# Patient Record
Sex: FEMALE | Race: OTHER | Employment: FULL TIME | URBAN - METROPOLITAN AREA
[De-identification: names, ages, dates, MRNs, and addresses within clinical notes are randomized per-mention and may not be internally consistent; named-entity substitution may affect disease eponyms.]

---

## 2021-01-03 ENCOUNTER — HOSPITAL ENCOUNTER (EMERGENCY)
Age: 61
Discharge: ANOTHER HOSPITAL | End: 2021-01-04
Attending: EMERGENCY MEDICINE
Payer: COMMERCIAL

## 2021-01-03 ENCOUNTER — APPOINTMENT (OUTPATIENT)
Dept: CT IMAGING | Age: 61
End: 2021-01-03
Payer: COMMERCIAL

## 2021-01-03 DIAGNOSIS — D61.818 PANCYTOPENIA (HCC): ICD-10-CM

## 2021-01-03 DIAGNOSIS — H40.052 INCREASED INTRAOCULAR PRESSURE, LEFT: Primary | ICD-10-CM

## 2021-01-03 DIAGNOSIS — R91.8 LUNG NODULES: ICD-10-CM

## 2021-01-03 DIAGNOSIS — R51.9 LEFT TEMPORAL HEADACHE: ICD-10-CM

## 2021-01-03 DIAGNOSIS — H53.9 VISUAL DISTURBANCE: ICD-10-CM

## 2021-01-03 LAB
A/G RATIO: 1.6 (ref 1.1–2.2)
ALBUMIN SERPL-MCNC: 4 G/DL (ref 3.4–5)
ALP BLD-CCNC: 94 U/L (ref 40–129)
ALT SERPL-CCNC: 11 U/L (ref 10–40)
ANION GAP SERPL CALCULATED.3IONS-SCNC: 8 MMOL/L (ref 3–16)
AST SERPL-CCNC: 16 U/L (ref 15–37)
BASOPHILS ABSOLUTE: 0.1 K/UL (ref 0–0.2)
BASOPHILS RELATIVE PERCENT: 0.8 %
BILIRUB SERPL-MCNC: 0.3 MG/DL (ref 0–1)
BILIRUBIN URINE: NEGATIVE
BLOOD, URINE: NEGATIVE
BUN BLDV-MCNC: 14 MG/DL (ref 7–20)
CALCIUM SERPL-MCNC: 8.9 MG/DL (ref 8.3–10.6)
CHLORIDE BLD-SCNC: 106 MMOL/L (ref 99–110)
CLARITY: CLEAR
CO2: 25 MMOL/L (ref 21–32)
COLOR: YELLOW
CREAT SERPL-MCNC: 0.7 MG/DL (ref 0.6–1.2)
EOSINOPHILS ABSOLUTE: 0.2 K/UL (ref 0–0.6)
EOSINOPHILS RELATIVE PERCENT: 2.4 %
EPITHELIAL CELLS, UA: 1 /HPF (ref 0–5)
GFR AFRICAN AMERICAN: >60
GFR NON-AFRICAN AMERICAN: >60
GLOBULIN: 2.5 G/DL
GLUCOSE BLD-MCNC: 110 MG/DL (ref 70–99)
GLUCOSE URINE: NEGATIVE MG/DL
HCT VFR BLD CALC: 24.2 % (ref 36–48)
HEMOGLOBIN: 7.4 G/DL (ref 12–16)
HYALINE CASTS: 1 /LPF (ref 0–8)
KETONES, URINE: NEGATIVE MG/DL
LEUKOCYTE ESTERASE, URINE: ABNORMAL
LYMPHOCYTES ABSOLUTE: 2.1 K/UL (ref 1–5.1)
LYMPHOCYTES RELATIVE PERCENT: 24.2 %
MCH RBC QN AUTO: 22.3 PG (ref 26–34)
MCHC RBC AUTO-ENTMCNC: 30.6 G/DL (ref 31–36)
MCV RBC AUTO: 72.7 FL (ref 80–100)
MICROSCOPIC EXAMINATION: YES
MONOCYTES ABSOLUTE: 0.7 K/UL (ref 0–1.3)
MONOCYTES RELATIVE PERCENT: 8.3 %
NEUTROPHILS ABSOLUTE: 5.5 K/UL (ref 1.7–7.7)
NEUTROPHILS RELATIVE PERCENT: 64.3 %
NITRITE, URINE: NEGATIVE
PDW BLD-RTO: 18.4 % (ref 12.4–15.4)
PH UA: 5.5 (ref 5–8)
PLATELET # BLD: 159 K/UL (ref 135–450)
PMV BLD AUTO: 10.7 FL (ref 5–10.5)
POTASSIUM REFLEX MAGNESIUM: 3.8 MMOL/L (ref 3.5–5.1)
PROTEIN UA: NEGATIVE MG/DL
RBC # BLD: 3.33 M/UL (ref 4–5.2)
RBC UA: 0 /HPF (ref 0–4)
SEDIMENTATION RATE, ERYTHROCYTE: 35 MM/HR (ref 0–30)
SODIUM BLD-SCNC: 139 MMOL/L (ref 136–145)
SPECIFIC GRAVITY UA: 1.01 (ref 1–1.03)
TOTAL PROTEIN: 6.5 G/DL (ref 6.4–8.2)
URINE REFLEX TO CULTURE: ABNORMAL
URINE TYPE: ABNORMAL
UROBILINOGEN, URINE: 0.2 E.U./DL
WBC # BLD: 8.5 K/UL (ref 4–11)
WBC UA: 4 /HPF (ref 0–5)

## 2021-01-03 PROCEDURE — 70498 CT ANGIOGRAPHY NECK: CPT

## 2021-01-03 PROCEDURE — 6360000004 HC RX CONTRAST MEDICATION: Performed by: EMERGENCY MEDICINE

## 2021-01-03 PROCEDURE — 70450 CT HEAD/BRAIN W/O DYE: CPT

## 2021-01-03 PROCEDURE — 2580000003 HC RX 258: Performed by: NURSE PRACTITIONER

## 2021-01-03 PROCEDURE — 99284 EMERGENCY DEPT VISIT MOD MDM: CPT

## 2021-01-03 PROCEDURE — 86140 C-REACTIVE PROTEIN: CPT

## 2021-01-03 PROCEDURE — 85025 COMPLETE CBC W/AUTO DIFF WBC: CPT

## 2021-01-03 PROCEDURE — 36415 COLL VENOUS BLD VENIPUNCTURE: CPT

## 2021-01-03 PROCEDURE — 80053 COMPREHEN METABOLIC PANEL: CPT

## 2021-01-03 PROCEDURE — 85652 RBC SED RATE AUTOMATED: CPT

## 2021-01-03 PROCEDURE — 81001 URINALYSIS AUTO W/SCOPE: CPT

## 2021-01-03 PROCEDURE — 85610 PROTHROMBIN TIME: CPT

## 2021-01-03 RX ORDER — PROPARACAINE HYDROCHLORIDE 5 MG/ML
1 SOLUTION/ DROPS OPHTHALMIC ONCE
Status: DISCONTINUED | OUTPATIENT
Start: 2021-01-03 | End: 2021-01-04 | Stop reason: HOSPADM

## 2021-01-03 RX ORDER — 0.9 % SODIUM CHLORIDE 0.9 %
1000 INTRAVENOUS SOLUTION INTRAVENOUS ONCE
Status: COMPLETED | OUTPATIENT
Start: 2021-01-03 | End: 2021-01-03

## 2021-01-03 RX ORDER — METHYLPREDNISOLONE SODIUM SUCCINATE 125 MG/2ML
125 INJECTION, POWDER, LYOPHILIZED, FOR SOLUTION INTRAMUSCULAR; INTRAVENOUS ONCE
Status: DISCONTINUED | OUTPATIENT
Start: 2021-01-03 | End: 2021-01-03

## 2021-01-03 RX ORDER — KETOROLAC TROMETHAMINE 30 MG/ML
15 INJECTION, SOLUTION INTRAMUSCULAR; INTRAVENOUS ONCE
Status: DISCONTINUED | OUTPATIENT
Start: 2021-01-03 | End: 2021-01-03

## 2021-01-03 RX ORDER — PROCHLORPERAZINE EDISYLATE 5 MG/ML
10 INJECTION INTRAMUSCULAR; INTRAVENOUS ONCE
Status: DISCONTINUED | OUTPATIENT
Start: 2021-01-03 | End: 2021-01-03

## 2021-01-03 RX ORDER — ACETAMINOPHEN 500 MG
1000 TABLET ORAL EVERY 6 HOURS PRN
COMMUNITY

## 2021-01-03 RX ORDER — LOSARTAN POTASSIUM 25 MG/1
25 TABLET ORAL DAILY
COMMUNITY
Start: 2020-03-02

## 2021-01-03 RX ADMIN — SODIUM CHLORIDE 1000 ML: 9 INJECTION, SOLUTION INTRAVENOUS at 21:51

## 2021-01-03 RX ADMIN — IOPAMIDOL 75 ML: 755 INJECTION, SOLUTION INTRAVENOUS at 23:18

## 2021-01-03 ASSESSMENT — PAIN DESCRIPTION - LOCATION
LOCATION: EYE
LOCATION: HEAD
LOCATION: EYE

## 2021-01-03 ASSESSMENT — VISUAL ACUITY
OD: 20/20
OU: 20/20

## 2021-01-03 ASSESSMENT — PAIN DESCRIPTION - PAIN TYPE
TYPE: ACUTE PAIN
TYPE: ACUTE PAIN

## 2021-01-03 ASSESSMENT — PAIN SCALES - GENERAL
PAINLEVEL_OUTOF10: 7
PAINLEVEL_OUTOF10: 7

## 2021-01-03 ASSESSMENT — PAIN DESCRIPTION - ORIENTATION: ORIENTATION: LEFT

## 2021-01-03 ASSESSMENT — PAIN DESCRIPTION - FREQUENCY
FREQUENCY: CONTINUOUS
FREQUENCY: CONTINUOUS

## 2021-01-03 ASSESSMENT — PAIN DESCRIPTION - PROGRESSION: CLINICAL_PROGRESSION: NOT CHANGED

## 2021-01-03 ASSESSMENT — PAIN - FUNCTIONAL ASSESSMENT: PAIN_FUNCTIONAL_ASSESSMENT: PREVENTS OR INTERFERES SOME ACTIVE ACTIVITIES AND ADLS

## 2021-01-04 VITALS
DIASTOLIC BLOOD PRESSURE: 48 MMHG | HEIGHT: 66 IN | WEIGHT: 165.57 LBS | RESPIRATION RATE: 17 BRPM | TEMPERATURE: 98.2 F | BODY MASS INDEX: 26.61 KG/M2 | SYSTOLIC BLOOD PRESSURE: 130 MMHG | OXYGEN SATURATION: 100 % | HEART RATE: 87 BPM

## 2021-01-04 LAB
C-REACTIVE PROTEIN: 1.6 MG/L (ref 0–5.1)
INR BLD: 1.03 (ref 0.86–1.14)
OCCULT BLOOD DIAGNOSTIC: NORMAL
PROTHROMBIN TIME: 11.9 SEC (ref 10–13.2)

## 2021-01-04 PROCEDURE — 96374 THER/PROPH/DIAG INJ IV PUSH: CPT

## 2021-01-04 PROCEDURE — G0328 FECAL BLOOD SCRN IMMUNOASSAY: HCPCS

## 2021-01-04 PROCEDURE — 6370000000 HC RX 637 (ALT 250 FOR IP): Performed by: NURSE PRACTITIONER

## 2021-01-04 PROCEDURE — 6360000002 HC RX W HCPCS: Performed by: NURSE PRACTITIONER

## 2021-01-04 RX ORDER — DORZOLAMIDE HCL 20 MG/ML
1 SOLUTION/ DROPS OPHTHALMIC ONCE
Status: COMPLETED | OUTPATIENT
Start: 2021-01-04 | End: 2021-01-04

## 2021-01-04 RX ORDER — BRIMONIDINE TARTRATE 2 MG/ML
1 SOLUTION/ DROPS OPHTHALMIC ONCE
Status: COMPLETED | OUTPATIENT
Start: 2021-01-04 | End: 2021-01-04

## 2021-01-04 RX ORDER — ACETAZOLAMIDE 500 MG/5ML
500 INJECTION, POWDER, LYOPHILIZED, FOR SOLUTION INTRAVENOUS ONCE
Status: COMPLETED | OUTPATIENT
Start: 2021-01-04 | End: 2021-01-04

## 2021-01-04 RX ORDER — TIMOLOL MALEATE 5 MG/ML
1 SOLUTION/ DROPS OPHTHALMIC ONCE
Status: COMPLETED | OUTPATIENT
Start: 2021-01-04 | End: 2021-01-04

## 2021-01-04 RX ADMIN — DORZOLAMIDE HYDROCHLORIDE 1 DROP: 20 SOLUTION/ DROPS OPHTHALMIC at 01:26

## 2021-01-04 RX ADMIN — BRIMONIDINE TARTRATE 1 DROP: 2 SOLUTION OPHTHALMIC at 01:26

## 2021-01-04 RX ADMIN — ACETAZOLAMIDE SODIUM 500 MG: 500 INJECTION, POWDER, LYOPHILIZED, FOR SOLUTION INTRAVENOUS at 01:39

## 2021-01-04 RX ADMIN — TIMOLOL MALEATE 1 DROP: 5 SOLUTION/ DROPS OPHTHALMIC at 01:26

## 2021-01-04 ASSESSMENT — ENCOUNTER SYMPTOMS
SHORTNESS OF BREATH: 0
COUGH: 0
CONSTIPATION: 0
COLOR CHANGE: 0
ABDOMINAL PAIN: 0
EYE PAIN: 1
VOMITING: 0

## 2021-01-04 NOTE — ED NOTES
Report given to Miroslava Xie, charge RN in the ER  Pain 7/10     Mima Mckeon RN  01/04/21 100 Gaurang Morel RN  01/04/21 [id number]

## 2021-01-04 NOTE — ED PROVIDER NOTES
[facility]        Pt Name: Tova Hutchins  MRN: [id number]  [place] 1960  Date of evaluation: 1/3/2021  Provider: GABRIEL Crowder CNP  PCP: [address] [place] [address]     I have seen and evaluated this patient with my supervising physician Wilfred Dow MD.    [facility]       Chief Complaint   Patient presents with    Migraine     c/o headache since 1pm while at work. OTC meds not effective. now left eye vision blurry. hx of migraines. HISTORY OF PRESENT ILLNESS   (Location, Timing/Onset, Context/Setting, Quality, Duration, Modifying Factors, Severity, Associated Signs and Symptoms)  Note limiting factors. Tova Hutchins is a 64 y.o. female with medical history of headache vertigo, cholecystectomy, colon polyps, and hypertension presents the ED with complaints of left-sided pain into her temporal region with blurry vision in her left eye that occurred approximately noon today. Patient had thought she was getting a migraine headache and was at work at a nursing home. She asked one of the staff there for Tylenol and took this at 1300 did not have relief of symptoms. She then later took aspirin and did not have relief of symptoms. He does note progressive blurry vision and is now only able to see the light and has a loss of distant vision. Patient does note a remote history of headaches and migraines, although says she has not had one this bad in approximately 2 years. When the pain started it was 9/10 and is currently at 7/10. She did notice worse when she was driving home and had difficulty seeing out of the left eye. She complains of pain behind the eye and no actual pain in the eye or when blinking. She denies seeing any flashers, floaters, or central vision loss. She does note wearing glasses, although believes the prescription is approximately 1years old.   Denies any family history of glaucoma she denies any recent trauma, accidents, head injuries, or falls. She denies any associated ataxia, dysphagia, lightheaded, dizzy, syncope, nausea, vomiting, diarrhea, chest pain, short of air, cough, wheezing, fever, rashes, neck pain, back pain, photophobia, eye discharge, periorbital edema. She denies any smoking, alcohol use, or street drugs. Nursing Notes were all reviewed and agreed with or any disagreements were addressed in the HPI. REVIEW OF SYSTEMS    (2-9 systems for level 4, 10 or more for level 5)     Review of Systems    Positives and Pertinent negatives as per HPI. Except as noted above in the ROS, all other systems were reviewed and negative. PAST MEDICAL HISTORY     Past Medical History:   Diagnosis Date    Headache(784.0)     Hypertension          SURGICAL HISTORY     Past Surgical History:   Procedure Laterality Date    CHOLECYSTECTOMY      SMALL INTESTINE SURGERY           CURRENTMEDICATIONS       Discharge Medication List as of 1/4/2021  2:17 AM      CONTINUE these medications which have NOT CHANGED    Details   vitamin D (CHOLECALCIFEROL) 25 MCG (1000 UT) TABS tablet Take 1,000 Units by mouth dailyHistorical Med      losartan (COZAAR) 25 MG tablet Take 25 mg by mouth dailyHistorical Med      acetaminophen (TYLENOL) 500 MG tablet Take 1,000 mg by mouth every 6 hours as needed for PainHistorical Med      polyethylene glycol (MIRALAX) powder Take 17 g by mouth daily as needed (constipation), Disp-510 g, R-0               ALLERGIES     Tetanus toxoids    FAMILYHISTORY     History reviewed. No pertinent family history.        SOCIAL HISTORY       Social History     Tobacco Use    Smoking status: Never Smoker    Smokeless tobacco: Never Used   Substance Use Topics    Alcohol use: No    Drug use: No       SCREENINGS             PHYSICAL EXAM    (up to 7 for level 4, 8 or more for level 5)     ED Triage Vitals [01/03/21 2005]   BP Temp Temp Source Pulse Resp SpO2 Height Weight   (!) 150/78 97.3 °F (36.3 °C) Temporal 84 16 100 % 5' 6\" (1.676 m) 165 lb 9.1 oz (75.1 kg)       Physical Exam  Vitals signs and nursing note reviewed. Constitutional:       General: She is awake. Appearance: Normal appearance. She is well-developed and overweight. HENT:      Head: Normocephalic and atraumatic. Nose: Nose normal.   Eyes:      General: Lids are normal.         Right eye: No discharge. Left eye: No discharge. Extraocular Movements: Extraocular movements intact. Conjunctiva/sclera: Conjunctivae normal.      Visual Fields: Right eye visual fields normal.      Left eye: DL in the upper nasal quadrant. DL in the upper temporal quadrant. DL in the lower nasal quadrant. DL in the lower temporal quadrant. Comments: Left cornea appears cloudy. Central vision in left eye detects movements when approximately 6 inches from face. Anything further patient does not see movements and only sees light. IOP OS 45, 48, 49  OD 16, 17, 19    Visual acuity could not be obtained from OS   Neck:      Musculoskeletal: Full passive range of motion without pain and normal range of motion. Cardiovascular:      Rate and Rhythm: Normal rate and regular rhythm. Heart sounds: Normal heart sounds. Pulmonary:      Effort: Pulmonary effort is normal. No respiratory distress. Breath sounds: Normal breath sounds. Abdominal:      General: Bowel sounds are normal.      Palpations: Abdomen is soft. Tenderness: There is no abdominal tenderness. Genitourinary:     Rectum: Guaiac result negative. No tenderness. Comments: Coleen Santana RN chaperoned rectal exam  Musculoskeletal: Normal range of motion. Right lower leg: No edema. Left lower leg: No edema. Skin:     General: Skin is warm and dry. Coloration: Skin is not pale. Neurological:      General: No focal deficit present.       Mental Status: She is alert and oriented to person, place, and noted below, none     Procedures    CRITICAL CARE TIME   N/A    CONSULTS:  IP CONSULT TO OPHTHALMOLOGY      EMERGENCY DEPARTMENT COURSE and DIFFERENTIAL DIAGNOSIS/MDM:   Vitals:    Vitals:    01/03/21 2300 01/04/21 0000 01/04/21 0100 01/04/21 0200   BP: 135/69 136/61 130/62 (!) 130/48   Pulse:   86 87   Resp:   17 17   Temp:    98.2 °F (36.8 °C)   TempSrc:    Oral   SpO2:   100% 100%   Weight:       Height:           Patient was given the following medications:  Medications   proparacaine (ALCAINE) 0.5 % ophthalmic solution 1 drop (has no administration in time range)   0.9 % sodium chloride bolus (0 mLs Intravenous Stopped 1/3/21 2258)   iopamidol (ISOVUE-370) 76 % injection 75 mL (75 mLs Intravenous Given 1/3/21 2318)   brimonidine (ALPHAGAN) 0.2 % ophthalmic solution 1 drop (1 drop Left Eye Given 1/4/21 0126)   timolol (TIMOPTIC) 0.5 % ophthalmic solution 1 drop (1 drop Left Eye Given 1/4/21 0126)   dorzolamide (TRUSOPT) 2 % ophthalmic solution 1 drop (1 drop Left Eye Given 1/4/21 0126)   acetaZOLAMIDE (DIAMOX) injection 500 mg (500 mg Intravenous Given 1/4/21 0139)           Pertinent Labs & Imaging studies reviewed. (See chart for details)   -  Patient seen and evaluated in the emergency department. -  Triage and nursing notes reviewed and incorporated. -  Old chart records reviewed and incorporated. -  Patient case discussed with attending physician,  Dr. Stevie Denson. They saw and examined patient.   -  Differential diagnosis includes:  Temporal arteritis, glaucoma, retinal artery occlusion, CVA, ICH, SAH, migraine, river blindness, giant cell arteritis, keratitis, uveitis, retinal detachment, vs COVID-19.  -  Work-up included:  See above ESR, CRP, CBC, CMP, UA, INR, CT head without, CTA head and neck, visual acuity  -  ED treatment included:  Alcaine, normal saline, brimonidine, timolol, dorzolamide, Diamox  - Consults: Ophthalmology, Dr. Ibeth Patterson, she request the patient to be started on ophthalmic drops of brimonidine, timolol, and dorzolamide 1 drop in the left eye now and to receive an IV infusion of Diamox 500 mg. If EMS has not arrived within 30 minutes of her administration to repeat the dosing in 30 minutes. She request the patient to be transferred to [facility] ophthalmology as she is also on-call at [facility] and patient will need further evaluation for a fall, work-up. -  Results discussed with patient. Labs show  Hemoccult negative. UA shows small leukocytes. CBC with RBC 3.33, hemoglobin 7.4, hematocrit 24.2, MCV 72.7, MCH 22.3, MCHC 30.6, RDW 18.4, MPV 10.7. These results were discussed with patient and she was informed previously that she had a form of iron deficiency anemia, although she was not instructed to take any iron supplements. She was told not to increase dietary intake of red meats due to the gout. CMP with glucose 110. ESR 35. Pro time 11.9, INR 1.03.  CT head neck with contrast shows no acute known arterial abnormality or hemodynamically significant arterial stenosis in the head or neck. Multiple sub-4 mm noncalcified nodules in the upper lungs are likely benign per guidelines below. No further follow-up is warranted. CT head without shows no acute intercranial abnormality. Results were discussed with the patient and need to be transferred to [facility] ED for further evaluation by ophthalmology. The patient is agreeable with plan of care and disposition.  -  Disposition:    Transfer to  accepting, Dr. Jayy Navarrete time was 45 minutes, excluding separately reportable procedures. There was a high probability of clinically significant/life threatening deterioration in the patient's condition which required my urgent intervention. FINAL IMPRESSION      1. Increased intraocular pressure, left    2. Lung nodules    3. Left temporal headache    4. Pancytopenia ([address]    5.  Visual disturbance          DISPOSITION/PLAN   DISPOSITION    Transfer to  ED         (Please note that portions of this note were completed with a voice recognition program.  Efforts were made to edit the dictations but occasionally words are mis-transcribed.)    GABRIEL Glass CNP (electronically signed)            GABRIEL Glass CNP  01/04/21 7566

## 2021-01-04 NOTE — ED PROVIDER NOTES
[facility]      Pt Name: Shahid Luna  MRN: [id number]  [place] 1960  Date of evaluation: 1/3/2021  Provider: Margot Gillespie MD    CHIEF COMPLAINT       Chief Complaint   Patient presents with    Migraine     c/o headache since 1pm while at work. OTC meds not effective. now left eye vision blurry. hx of migraines. HISTORY OF PRESENT ILLNESS    Shahid Luna is a 64 y.o. female who presents to the emergency department with headache since 1 PM.  Endorses pain behind left eye with blurry vision. Over-the-counter medications not effective. Pain is 8 out of 10 dull in nature. Never happened before. No other associated symptoms. Nursing Notes were reviewed. Including nursing noted for FM, Surgical History, Past Medical History, Social History, vitals, and allergies; agree with all. REVIEW OF SYSTEMS       Review of Systems   Constitutional: Negative for diaphoresis and unexpected weight change. HENT: Negative for congestion and dental problem. Eyes: Positive for pain and visual disturbance. Respiratory: Negative for cough and shortness of breath. Cardiovascular: Negative for chest pain and leg swelling. Gastrointestinal: Negative for abdominal pain, constipation and vomiting. Endocrine: Negative for cold intolerance and heat intolerance. Genitourinary: Negative for vaginal bleeding, vaginal discharge and vaginal pain. Musculoskeletal: Negative for neck pain and neck stiffness. Skin: Negative for color change and pallor. Neurological: Negative for tremors and weakness. Psychiatric/Behavioral: Negative for agitation and behavioral problems. Except as noted above the remainder of the review of systems was reviewed and negative.      PAST MEDICAL HISTORY     Past Medical History:   Diagnosis Date    Headache(784.0)     Hypertension        SURGICAL HISTORY       Past Surgical History: Procedure Laterality Date    CHOLECYSTECTOMY      SMALL INTESTINE SURGERY         CURRENT MEDICATIONS       Discharge Medication List as of 1/4/2021  2:17 AM      CONTINUE these medications which have NOT CHANGED    Details   vitamin D (CHOLECALCIFEROL) 25 MCG (1000 UT) TABS tablet Take 1,000 Units by mouth dailyHistorical Med      losartan (COZAAR) 25 MG tablet Take 25 mg by mouth dailyHistorical Med      acetaminophen (TYLENOL) 500 MG tablet Take 1,000 mg by mouth every 6 hours as needed for PainHistorical Med      polyethylene glycol (MIRALAX) powder Take 17 g by mouth daily as needed (constipation), Disp-510 g, R-0             ALLERGIES     Tetanus toxoids    FAMILY HISTORY      History reviewed. No pertinent family history.     SOCIAL HISTORY       Social History     Socioeconomic History    Marital status:      Spouse name: None    Number of children: None    Years of education: None    Highest education level: None   Occupational History    None   Social Needs    Financial resource strain: None    Food insecurity     Worry: None     Inability: None    Transportation needs     Medical: None     Non-medical: None   Tobacco Use    Smoking status: Never Smoker    Smokeless tobacco: Never Used   Substance and Sexual Activity    Alcohol use: No    Drug use: No    Sexual activity: None   Lifestyle    Physical activity     Days per week: None     Minutes per session: None    Stress: None   Relationships    Social connections     Talks on phone: None     Gets together: None     Attends Yazdanism service: None     Active member of club or organization: None     Attends meetings of clubs or organizations: None     Relationship status: None    Intimate partner violence     Fear of current or ex partner: None     Emotionally abused: None     Physically abused: None     Forced sexual activity: None   Other Topics Concern    None   Social History Narrative    None       PHYSICAL EXAM       ED Triage Vitals [01/03/21 2005]   BP Temp Temp Source Pulse Resp SpO2 Height Weight   (!) 150/78 97.3 °F (36.3 °C) Temporal 84 16 100 % 5' 6\" (1.676 m) 165 lb 9.1 oz (75.1 kg)       Physical Exam  Vitals signs and nursing note reviewed. Constitutional:       General: She is not in acute distress. Appearance: She is well-developed. She is not ill-appearing, toxic-appearing or diaphoretic. HENT:      Head: Normocephalic and atraumatic. Right Ear: External ear normal.      Left Ear: External ear normal.   Eyes:      General:         Right eye: No discharge. Left eye: No discharge. Conjunctiva/sclera: Conjunctivae normal.      Pupils: Pupils are equal, round, and reactive to light. Neck:      Musculoskeletal: Normal range of motion and neck supple. Cardiovascular:      Rate and Rhythm: Normal rate and regular rhythm. Heart sounds: No murmur. Pulmonary:      Effort: Pulmonary effort is normal. No respiratory distress. Breath sounds: Normal breath sounds. No wheezing or rales. Abdominal:      General: Bowel sounds are normal. There is no distension. Palpations: Abdomen is soft. There is no mass. Tenderness: There is no abdominal tenderness. There is no guarding or rebound. Genitourinary:     Comments: Deferred  Musculoskeletal: Normal range of motion. General: No deformity. Skin:     General: Skin is warm. Findings: No erythema or rash. Neurological:      Mental Status: She is alert and oriented to person, place, and time. She is not disoriented. Cranial Nerves: No cranial nerve deficit. Motor: No atrophy or abnormal muscle tone. Coordination: Coordination normal.   Psychiatric:         Behavior: Behavior normal.         Thought Content:  Thought content normal.         DIAGNOSTIC RESULTS     EKG: All EKG's are interpreted by the Emergency Department Physician who either signs or Co-signs this chart in the absence of acardiologist.    None    RADIOLOGY:   Non-plain film images such as CT, Ultrasoundand MRI are read by the radiologist. Plain radiographic images are visualized and preliminarily interpreted by the emergency physician with the below findings:    Impression   1. No acute arterial abnormality or hemodynamically significant arterial   stenosis in the head or neck. 2. Multiple sub 4 mm noncalcified nodules in the upper lungs are likely   benign per guidelines below.  No further follow-up is warranted.       RECOMMENDATIONS:   Multiple pulmonary nodules. Most severe: 4.0 mm solid pulmonary nodule within   the upper lobe detected on incomplete chest CT. No routine follow-up imaging   is recommended.       These guidelines do not apply to immunocompromised patients and patients with   cancer. Follow up in patients with significant comorbidities as clinically   warranted. For lung cancer screening, adhere to Lung-RADS guidelines. Reference: Radiology. 2017; 284(1):228-43. Impression   No acute intracranial abnormality.      ED BEDSIDE ULTRASOUND:   Performed by ED Physician - none    LABS:  Labs Reviewed   CBC WITH AUTO DIFFERENTIAL - Abnormal; Notable for the following components:       Result Value    RBC 3.33 (*)     Hemoglobin 7.4 (*)     Hematocrit 24.2 (*)     MCV 72.7 (*)     MCH 22.3 (*)     MCHC 30.6 (*)     RDW 18.4 (*)     MPV 10.7 (*)     All other components within normal limits    Narrative:     Performed at:  [address] [address]   Phone [phone]   COMPREHENSIVE METABOLIC PANEL W/ REFLEX TO MG FOR LOW K - Abnormal; Notable for the following components:    Glucose 110 (*)     All other components within normal limits    Narrative:     Performed at:  [address] [address]   Phone [phone]   URINE RT REFLEX TO CULTURE - Abnormal; Notable for the following excluding separately reportable procedures. There was a high probability of clinically significant/life threatening deterioration in the patient's condition which required my urgent intervention. PROCEDURES:  Unlessotherwise noted below, none    FINAL IMPRESSION      1. Increased intraocular pressure, left    2. Lung nodules    3. Left temporal headache    4. Pancytopenia ([address]    5.  Visual disturbance          DISPOSITION/PLAN   DISPOSITION Decision To Transfer 01/04/2021 01:06:15 AM   (Please note that portions ofthis note were completed with a voice recognition program.  Efforts were made to edit the dictations but occasionally words are mis-transcribed.)    Merary Rey MD(electronically signed)  Attending Emergency Physician            Merary Rey MD  01/04/21 [id number]

## 2022-01-05 ENCOUNTER — APPOINTMENT (OUTPATIENT)
Dept: GENERAL RADIOLOGY | Age: 62
End: 2022-01-05
Payer: COMMERCIAL

## 2022-01-05 ENCOUNTER — HOSPITAL ENCOUNTER (EMERGENCY)
Age: 62
Discharge: HOME | End: 2022-01-05
Payer: COMMERCIAL

## 2022-01-05 VITALS
BODY MASS INDEX: 25.93 KG/M2 | HEART RATE: 83 BPM | HEIGHT: 67 IN | RESPIRATION RATE: 16 BRPM | OXYGEN SATURATION: 97 % | DIASTOLIC BLOOD PRESSURE: 71 MMHG | TEMPERATURE: 98.7 F | SYSTOLIC BLOOD PRESSURE: 143 MMHG

## 2022-01-05 DIAGNOSIS — R07.81 RIB PAIN ON LEFT SIDE: Primary | ICD-10-CM

## 2022-01-05 PROCEDURE — 71101 X-RAY EXAM UNILAT RIBS/CHEST: CPT

## 2022-01-05 PROCEDURE — 99283 EMERGENCY DEPT VISIT LOW MDM: CPT

## 2022-01-05 RX ORDER — NAPROXEN 375 MG/1
375 TABLET ORAL 2 TIMES DAILY WITH MEALS
Qty: 20 TABLET | Refills: 0 | Status: SHIPPED | OUTPATIENT
Start: 2022-01-05 | End: 2022-04-22

## 2022-01-05 ASSESSMENT — PAIN DESCRIPTION - PAIN TYPE: TYPE: ACUTE PAIN

## 2022-01-05 ASSESSMENT — PAIN SCALES - GENERAL: PAINLEVEL_OUTOF10: 5

## 2022-01-05 NOTE — ED PROVIDER NOTES
[facility]        Pt Name: Tameka Panchal  MRN: [id number]  [place] 1960  Date of evaluation: 1/5/2022  Provider: Jarett Osman PA-C  PCP: Boyd MARTINEZ  Note Started: 2:29 PM EST       KURT. I have evaluated this patient. My supervising physician was available for consultation. Althea Cyr MD      CHIEF COMPLAINT       Chief Complaint   Patient presents with    Rib Pain     pushing a pt and states her left elbow went into her ribs, now has pain       HISTORY OF PRESENT ILLNESS   (Location, Timing/Onset, Context/Setting, Quality, Duration, Modifying Factors, Severity, Associated Signs and Symptoms)  Note limiting factors. Chief Complaint: Left chest wall injury    Tameka Panchal is a 58 y.o. female who presents with the above complaint. 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  Pain in the area. No rash. No shortness of breath. No previous history of fracture. This is reported at work. She did come in for x-ray evaluation rule out rib fracture. Reporting no shortness of breath or hemoptysis. Nursing Notes were all reviewed and agreed with or any disagreements were addressed in the HPI. REVIEW OF SYSTEMS    (2-9 systems for level 4, 10 or more for level 5)     Review of Systems    Positives and Pertinent negatives as per HPI. Except as noted above in the ROS, all other systems were reviewed and negative.        PAST MEDICAL HISTORY     Past Medical History:   Diagnosis Date    Headache(784.0)     Hypertension          SURGICAL HISTORY     Past Surgical History:   Procedure Laterality Date    CHOLECYSTECTOMY      SMALL INTESTINE SURGERY           CURRENTMEDICATIONS       Discharge Medication List as of 1/5/2022  3:46 PM      CONTINUE these medications which have NOT CHANGED    Details   vitamin D (CHOLECALCIFEROL) 25 MCG (1000 UT) TABS tablet Take 1,000 Units by mouth dailyHistorical Med      losartan (COZAAR) 25 MG tablet Take 25 mg by mouth dailyHistorical Med      acetaminophen (TYLENOL) 500 MG tablet Take 1,000 mg by mouth every 6 hours as needed for PainHistorical Med      polyethylene glycol (MIRALAX) powder Take 17 g by mouth daily as needed (constipation), Disp-510 g, R-0               ALLERGIES     Tetanus toxoids    FAMILYHISTORY     History reviewed. No pertinent family history. SOCIAL HISTORY       Social History     Tobacco Use    Smoking status: Never Smoker    Smokeless tobacco: Never Used   Substance Use Topics    Alcohol use: No    Drug use: No       SCREENINGS             PHYSICAL EXAM    (up to 7 for level 4, 8 or more for level 5)     ED Triage Vitals [01/05/22 1407]   BP Temp Temp Source Pulse Resp SpO2 Height Weight   (!) 143/71 98.7 °F (37.1 °C) Tympanic 83 16 97 % 5' 7\" (1.702 m) --       Physical Exam  Vitals and nursing note reviewed. Constitutional:       Appearance: Normal appearance. She is well-developed and normal weight. HENT:      Head: Normocephalic and atraumatic. Right Ear: External ear normal.      Left Ear: External ear normal.   Eyes:      General: No scleral icterus. Right eye: No discharge. Left eye: No discharge. Conjunctiva/sclera: Conjunctivae normal.   Cardiovascular:      Rate and Rhythm: Normal rate and regular rhythm. Heart sounds: Normal heart sounds. Pulmonary:      Effort: Pulmonary effort is normal.      Breath sounds: Normal breath sounds. Comments: I find no rash. I find no crepitation, step-off or instability. No flow segment. Tenderness noted on my exam.  Chest:      Chest wall: Tenderness present. Abdominal:      General: Bowel sounds are normal.      Palpations: Abdomen is soft. Tenderness: There is no abdominal tenderness.    Musculoskeletal: General: Normal range of motion. Cervical back: Normal range of motion and neck supple. Skin:     General: Skin is warm and dry. Neurological:      General: No focal deficit present. Mental Status: She is alert and oriented to person, place, and time. Mental status is at baseline. Psychiatric:         Mood and Affect: Mood normal.         Behavior: Behavior normal.         Thought Content: Thought content normal.         Judgment: Judgment normal.         DIAGNOSTIC RESULTS   LABS:    Labs Reviewed - No data to display    When ordered only abnormal lab results are displayed. All other labs were within normal range or not returned as of this dictation. EKG: When ordered, EKG's are interpreted by the Emergency Department Physician in the absence of a cardiologist.  Please see their note for interpretation of EKG. RADIOLOGY:   Non-plain film images such as CT, Ultrasound and MRI are read by the radiologist. Plain radiographic images are visualized and preliminarily interpreted by the ED Provider with the below findings:        Interpretation per the Radiologist below, if available at the time of this note:    XR RIBS LEFT INCLUDE CHEST (MIN 3 VIEWS)   Final Result   No radiographic evidence of acute displaced rib fracture. No results found. PROCEDURES   Unless otherwise noted below, none     Procedures    CRITICAL CARE TIME   N/A    CONSULTS:  None      EMERGENCY DEPARTMENT COURSE and DIFFERENTIAL DIAGNOSIS/MDM:   Vitals:    Vitals:    01/05/22 1407   BP: (!) 143/71   Pulse: 83   Resp: 16   Temp: 98.7 °F (37.1 °C)   TempSrc: Tympanic   SpO2: 97%   Height: 5' 7\" (1.702 m)       Patient was given the following medications:  Medications - No data to display        Patient resenting with injury to the left anterior lateral chest.  States her own elbow was thrust backwards while she was at work cleaning yesterday. Pain such that she had to come in for evaluation and off work today. X-ray chest and left ribs negative. No cardiopulmonary abnormality. Patient reassured. Start naproxen 375 mg twice daily. She may supplement Tylenol 1000 g 3 times daily for additional pain control. Deep breathing recommended periodically to prevent a respiratory infection. Return to work Monday. Patient given note. Patient does express understanding of her diagnosis and the treatment plan. FINAL IMPRESSION      1. Rib pain on left side          DISPOSITION/PLAN   DISPOSITION Decision To Discharge 01/05/2022 03:32:53 PM      PATIENT REFERRED TO:  Angela Byrd [facility].  [address]  [address] [facility]  [phone]    Schedule an appointment as soon as possible for a visit in 2 days      [facility] Emergency Department  [address] [id number] [phone]  Go to   If symptoms worsen      DISCHARGE MEDICATIONS:  Discharge Medication List as of 1/5/2022  3:46 PM      START taking these medications    Details   naproxen (NAPROSYN) 375 MG tablet Take 1 tablet by mouth 2 times daily (with meals) for 10 days, Disp-20 tablet, R-0Print             DISCONTINUED MEDICATIONS:  Discharge Medication List as of 1/5/2022  3:46 PM                 (Please note that portions of this note were completed with a voice recognition program.  Efforts were made to edit the dictations but occasionally words are mis-transcribed. )    Endy Treviño PA-C (electronically signed)           Endy Treviño PA-C  01/05/22 8686

## 2022-01-05 NOTE — Clinical Note
Paula Macedo was seen and treated in our emergency department on 1/5/2022. She may return to work on 01/10/2022. If you have any questions or concerns, please don't hesitate to call.       Deangelo Almazan PA-C

## 2022-01-05 NOTE — ED NOTES
Discharge and education instructions reviewed. Patient verbalized understanding, teach-back successful. Patient denied questions at this time. No acute distress noted. Patient instructed to follow-up as noted - return to emergency department if symptoms worsen. Patient verbalized understanding. Discharged per EDMD with discharged instructions.        Shanthi Farr RN  01/05/22 9399

## 2022-01-05 NOTE — Clinical Note
Tayla Rivas was seen and treated in our emergency department on 1/5/2022. She may return to work on 01/10/2022. If you have any questions or concerns, please don't hesitate to call.       Endy Treviño PA-C

## 2022-04-22 ENCOUNTER — HOSPITAL ENCOUNTER (EMERGENCY)
Age: 62
Discharge: HOME | End: 2022-04-22
Attending: EMERGENCY MEDICINE
Payer: COMMERCIAL

## 2022-04-22 VITALS
RESPIRATION RATE: 19 BRPM | HEIGHT: 66 IN | HEART RATE: 77 BPM | SYSTOLIC BLOOD PRESSURE: 140 MMHG | WEIGHT: 162.7 LBS | OXYGEN SATURATION: 100 % | TEMPERATURE: 98 F | DIASTOLIC BLOOD PRESSURE: 80 MMHG | BODY MASS INDEX: 26.15 KG/M2

## 2022-04-22 DIAGNOSIS — T43.615A ADVERSE EFFECT OF CAFFEINE, INITIAL ENCOUNTER: ICD-10-CM

## 2022-04-22 DIAGNOSIS — I47.1 PAROXYSMAL SUPRAVENTRICULAR TACHYCARDIA (HCC): Primary | ICD-10-CM

## 2022-04-22 DIAGNOSIS — D64.9 ANEMIA, UNSPECIFIED TYPE: ICD-10-CM

## 2022-04-22 LAB
A/G RATIO: 1.5 (ref 1.1–2.2)
ALBUMIN SERPL-MCNC: 4.1 G/DL (ref 3.4–5)
ALP BLD-CCNC: 104 U/L (ref 40–129)
ALT SERPL-CCNC: 11 U/L (ref 10–40)
ANION GAP SERPL CALCULATED.3IONS-SCNC: 9 MMOL/L (ref 3–16)
ANISOCYTOSIS: ABNORMAL
AST SERPL-CCNC: 19 U/L (ref 15–37)
BASOPHILS ABSOLUTE: 0.1 K/UL (ref 0–0.2)
BASOPHILS RELATIVE PERCENT: 0.7 %
BILIRUB SERPL-MCNC: 0.4 MG/DL (ref 0–1)
BUN BLDV-MCNC: 16 MG/DL (ref 7–20)
CALCIUM SERPL-MCNC: 9 MG/DL (ref 8.3–10.6)
CHLORIDE BLD-SCNC: 103 MMOL/L (ref 99–110)
CO2: 25 MMOL/L (ref 21–32)
CREAT SERPL-MCNC: 0.7 MG/DL (ref 0.6–1.2)
EKG ATRIAL RATE: 94 BPM
EKG DIAGNOSIS: NORMAL
EKG P AXIS: 67 DEGREES
EKG P-R INTERVAL: 138 MS
EKG Q-T INTERVAL: 342 MS
EKG QRS DURATION: 70 MS
EKG QTC CALCULATION (BAZETT): 427 MS
EKG R AXIS: 28 DEGREES
EKG T AXIS: 37 DEGREES
EKG VENTRICULAR RATE: 94 BPM
EOSINOPHILS ABSOLUTE: 0.2 K/UL (ref 0–0.6)
EOSINOPHILS RELATIVE PERCENT: 1.9 %
GFR AFRICAN AMERICAN: >60
GFR NON-AFRICAN AMERICAN: >60
GLUCOSE BLD-MCNC: 117 MG/DL (ref 70–99)
HCT VFR BLD CALC: 34.3 % (ref 36–48)
HEMOGLOBIN: 11.3 G/DL (ref 12–16)
LYMPHOCYTES ABSOLUTE: 1.9 K/UL (ref 1–5.1)
LYMPHOCYTES RELATIVE PERCENT: 24.1 %
MACROCYTES: ABNORMAL
MCH RBC QN AUTO: 28.1 PG (ref 26–34)
MCHC RBC AUTO-ENTMCNC: 32.9 G/DL (ref 31–36)
MCV RBC AUTO: 85.4 FL (ref 80–100)
MICROCYTES: ABNORMAL
MONOCYTES ABSOLUTE: 0.6 K/UL (ref 0–1.3)
MONOCYTES RELATIVE PERCENT: 7.8 %
NEUTROPHILS ABSOLUTE: 5.3 K/UL (ref 1.7–7.7)
NEUTROPHILS RELATIVE PERCENT: 65.5 %
OVALOCYTES: ABNORMAL
PDW BLD-RTO: 15.5 % (ref 12.4–15.4)
PLATELET # BLD: 181 K/UL (ref 135–450)
PLATELET SLIDE REVIEW: ADEQUATE
PMV BLD AUTO: 11.5 FL (ref 5–10.5)
POIKILOCYTES: ABNORMAL
POTASSIUM REFLEX MAGNESIUM: 3.7 MMOL/L (ref 3.5–5.1)
RBC # BLD: 4.02 M/UL (ref 4–5.2)
SLIDE REVIEW: ABNORMAL
SODIUM BLD-SCNC: 137 MMOL/L (ref 136–145)
TOTAL PROTEIN: 6.9 G/DL (ref 6.4–8.2)
TROPONIN: <0.01 NG/ML
TSH REFLEX: 1.16 UIU/ML (ref 0.27–4.2)
WBC # BLD: 8 K/UL (ref 4–11)

## 2022-04-22 PROCEDURE — 93005 ELECTROCARDIOGRAM TRACING: CPT | Performed by: EMERGENCY MEDICINE

## 2022-04-22 PROCEDURE — 99284 EMERGENCY DEPT VISIT MOD MDM: CPT

## 2022-04-22 PROCEDURE — 85025 COMPLETE CBC W/AUTO DIFF WBC: CPT

## 2022-04-22 PROCEDURE — 80053 COMPREHEN METABOLIC PANEL: CPT

## 2022-04-22 PROCEDURE — 96374 THER/PROPH/DIAG INJ IV PUSH: CPT

## 2022-04-22 PROCEDURE — 36415 COLL VENOUS BLD VENIPUNCTURE: CPT

## 2022-04-22 PROCEDURE — 84484 ASSAY OF TROPONIN QUANT: CPT

## 2022-04-22 PROCEDURE — 2580000003 HC RX 258: Performed by: PHYSICIAN ASSISTANT

## 2022-04-22 PROCEDURE — 84443 ASSAY THYROID STIM HORMONE: CPT

## 2022-04-22 PROCEDURE — 93010 ELECTROCARDIOGRAM REPORT: CPT | Performed by: INTERNAL MEDICINE

## 2022-04-22 PROCEDURE — 6360000002 HC RX W HCPCS: Performed by: PHYSICIAN ASSISTANT

## 2022-04-22 PROCEDURE — 92960 CARDIOVERSION ELECTRIC EXT: CPT

## 2022-04-22 RX ORDER — ADENOSINE 3 MG/ML
6 INJECTION, SOLUTION INTRAVENOUS ONCE
Status: COMPLETED | OUTPATIENT
Start: 2022-04-22 | End: 2022-04-22

## 2022-04-22 RX ORDER — 0.9 % SODIUM CHLORIDE 0.9 %
1000 INTRAVENOUS SOLUTION INTRAVENOUS ONCE
Status: COMPLETED | OUTPATIENT
Start: 2022-04-22 | End: 2022-04-22

## 2022-04-22 RX ORDER — LANOLIN ALCOHOL/MO/W.PET/CERES
1 CREAM (GRAM) TOPICAL
COMMUNITY
Start: 2021-04-29

## 2022-04-22 RX ADMIN — ADENOSINE 6 MG: 3 INJECTION, SOLUTION INTRAVENOUS at 14:23

## 2022-04-22 RX ADMIN — SODIUM CHLORIDE 1000 ML: 9 INJECTION, SOLUTION INTRAVENOUS at 14:30

## 2022-04-22 ASSESSMENT — PAIN - FUNCTIONAL ASSESSMENT: PAIN_FUNCTIONAL_ASSESSMENT: NONE - DENIES PAIN

## 2022-04-22 NOTE — ED NOTES
SHERRILL Beavers at bedside. Patient denies having any symptoms at this time. Denies chest pain.       Evangelina Spears RN  04/22/22 3330

## 2022-04-22 NOTE — ED NOTES
Notified Dr. Linda Casey regarding patient status. Jill Coughlin, EMT-P attempted vagal maneuver unsuccessful.       Marko Yoon RN  04/22/22 7336

## 2022-04-22 NOTE — ED PROVIDER NOTES
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  They took my vitals and my blood pressure was good but my heart rate was fast so they gave me 2 bottles of water to drink and then when they rechecked it my blood pressure was still good but my heart rate was even faster in the 160s  HISTORY OF PRESENT ILLNESS  (Location/Symptom, Timing/Onset,Context/Setting, Quality, Duration, Modifying Factors, Severity). Note limiting factors. This patient was seen by the advance practice provider. I have seen and examined the patient, agree with the workup, evaluation, management and diagnosis. The care plan has been discussed. Chief Complaint   Patient presents with    Chest Pain     onset [address] today. denies n/v.  palpitations        Mary Bertrand is a 58 y.o. female who presents to the emergency department secondary to concern for chest discomfort. She states it is not really a pain so much as feeling like her heart is beating really hard. Denies any nausea, vomiting, fevers, chills, syncope, diaphoresis. No abdominal pain. No trouble breathing. She states she is feeling better now than she did earlier. No known history of any cardiac disease such as heart attack or coronary artery disease but she does states she was previously told she had a little leak in one of her valves. She does drink coffee, 2 cups a day, she states they are 8 ounce cups but very strong she bruised her coffee herself. Her  at the bedside does seem to think she drinks more than 2 cups. Past medical history noted below:    has a past medical history of Headache(784.0) and Hypertension.    Social History     Socioeconomic History    Marital status:      Spouse name: Not on file    Number of children: Not on file    Years of education: Not on file  Highest education level: Not on file   Occupational History    Not on file   Tobacco Use    Smoking status: Never Smoker    Smokeless tobacco: Never Used   Substance and Sexual Activity    Alcohol use: No    Drug use: No    Sexual activity: Not on file   Other Topics Concern    Not on file   Social History Narrative    Not on file     Social Determinants of Health     Financial Resource Strain:     Difficulty of Paying Living Expenses: Not on file   Food Insecurity:     Worried About Running Out of Food in the Last Year: Not on file    Arnold of Food in the Last Year: Not on file   Transportation Needs:     Lack of Transportation (Medical): Not on file    Lack of Transportation (Non-Medical): Not on file   Physical Activity:     Days of Exercise per Week: Not on file    Minutes of Exercise per Session: Not on file   Stress:     Feeling of Stress : Not on file   Social Connections:     Frequency of Communication with Friends and Family: Not on file    Frequency of Social Gatherings with Friends and Family: Not on file    Attends Yarsanism Services: Not on file    Active Member of [address] [place] [address] or Organizations: Not on file    Attends Club or Organization Meetings: Not on file    Marital Status: Not on file   Intimate Partner Violence:     Fear of Current or Ex-Partner: Not on file    Emotionally Abused: Not on file    Physically Abused: Not on file    Sexually Abused: Not on file   Housing Stability:     Unable to Pay for Housing in the Last Year: Not on file    Number of [place] in the Last Year: Not on file    Unstable Housing in the Last Year: Not on file     Aside from what is stated above denies any other symptoms or modifying factors. Nursing Notes reviewed. Past Surgical History:   Procedure Laterality Date    CHOLECYSTECTOMY      SMALL INTESTINE SURGERY       No family history on file.     CURRENT MEDICATIONS       Previous Medications    ACETAMINOPHEN (TYLENOL) 500 MG to the low 100s. Decision was made at that time to give her some IV fluids and continue to monitor. On reassessment vitals remained hemodynamically stable, heart rate had improved to the 70s, she had no new symptoms. Repeat EKG normal sinus rhythm. Discussed results of labs showing no electrolyte abnormalities or thyroid abnormalities. Discussed potential adverse side effects of caffeine and recommendation to decrease intake. She is anemic though this is a chronic issue for her. Discussed following up with primary care, she also requested a cardiology referral which was given to her. She expressed understanding of all instructions, was in agreement with plan, and was discharged home in stable condition. Cardiovert / Defib    Date/Time: 4/22/2022 4:20 PM  Performed by: Daryl Duron MD  Authorized by: Daryl Duron MD     Consent:     Consent obtained:  Verbal    Consent given by:  Patient and spouse    Alternatives discussed: discussed use of electronic cardioversion if medication was unsuccessful. Pre-procedure details:     Cardioversion basis:  Emergent    Rhythm:  Supraventricular tachycardia  Comments:      Medical cardioversion was done with 6 mg of IV adenosine given through a peripheral IV. She did have the pads placed in anterior posterior positioning due to potential adverse effects of the adenosine. Patient tolerated the procedure well. Her heart rate initially decreased to the 90s though then increased back up to the 130s. However after a few minutes she again decreased down to the low 100s and decision was made to continue to monitor. Critical Care:  I personally saw the patient and independently provided 10 minutes of nonconcurrent critical care out of the total shared critical care time provided. Due to the immediate potential for life-threatening deterioration due to cardiac arrhythmia requiring cardioversion, I spent 10 minutes providing critical care.   This time excludes time spent performing procedures but includes time spent on direct patient care, history retrieval, review of the chart, and discussions with patient, family, and consultant(s). FINAL IMPRESSION      1. Paroxysmal supraventricular tachycardia ([facility] [address]    2. Adverse effect of caffeine, initial encounter    3.  Anemia, unspecified type        DISPOSITION/PLAN   DISPOSITION Decision To Discharge 04/22/2022 03:50:33 PM      PATIENT REFERRED TO:  Cl Leung MD  [address]  [place] [address]  [phone]    Call in 1 week  For follow up appointment    Keyona Rondon MD  [address] [facility] [address] [place] [address]  [phone]    Call   as needed, for cardiology care      DISCHARGE MEDICATIONS:  New Prescriptions    No medications on file            (Please note that portions of this note were completed with a voice recognition program. Efforts were made to edit the dictations but occasionally words are mis-transcribed.)    Kennedy Desai MD (electronically signed)  Attending Emergency Physician        Kennedy Desai MD  04/22/22 7940

## 2022-04-22 NOTE — ED NOTES
Darrell Negrete RN in room Dr. Jordan Jovel RN in room. Patient alert and oriented x4. SHERRILL Beavers and student in room. Sara Figueroa EMT-P in room.       Evangelina Spears RN  04/22/22 7002

## 2022-04-22 NOTE — ED NOTES
.Pt discharged at this time. Discharge instructions and medications reviewed,  Questions were answered. PT verbalized understanding. VSS, Afebrile. Follow up appointments were discussed.          [facility]  04/22/22 7302

## 2022-04-22 NOTE — ED PROVIDER NOTES
[address] [facility]        Pt Name: Adriane Bennett  MRN: [id number]  [place] 1960  Date of evaluation: 4/22/2022  Provider: SHERRILL Clemons  PCP: Ivanna Freeman MD  Note Started: 2:40 PM EDT     This patient was also seen and evaluated by Attending Physician Dante Gregg MD.    [address] [place] [address]       Chief Complaint   Patient presents with    Chest Pain     onset [address] today. denies n/v.  palpitations       HISTORY OF PRESENT ILLNESS   (Location, Timing/Onset, Context/Setting, Quality, Duration, Modifying Factors, Severity, Associated Signs and Symptoms)  Note limiting factors. Chief Complaint: Palpitations, heart racing    Adriane Bennett is a 58 y.o. female who presents reporting that beginning around 10:00 AM today she has felt her heart racing. Says she was not doing anything particularly stressful at that time, and symptoms have waxed and waned a bit since onset but have not really gone away. He denies any prior history of symptoms like this. Denies any known heart problems. Says she is not having any difficulty breathing, is not nauseous. Denies any recent trauma. Denies severe anxiety. No recent medication changes or drug use. She denies any chest pain. Says she has high blood pressure but no other known medical problems. Nursing Notes were all reviewed and agreed with or any disagreements were addressed in the HPI. REVIEW OF SYSTEMS    (2-9 systems for level 4, 10 or more for level 5)     Review of Systems    Positives and pertinent negatives as per HPI.      PAST MEDICAL HISTORY     Past Medical History:   Diagnosis Date    Headache(784.0)     Hypertension        SURGICAL HISTORY     Past Surgical History:   Procedure Laterality Date    CHOLECYSTECTOMY      SMALL INTESTINE SURGERY         CURRENTMEDICATIONS       Previous Medications    ACETAMINOPHEN (TYLENOL) 500 MG TABLET    Take 1,000 mg by mouth every 6 hours as needed for Pain    FERROUS SULFATE (FE TABS 325) 325 (65 FE) MG EC TABLET    Take 1 tablet by mouth daily (with breakfast)    LOSARTAN (COZAAR) 25 MG TABLET    Take 25 mg by mouth daily    VITAMIN D (CHOLECALCIFEROL) 25 MCG (1000 UT) TABS TABLET    Take 1,000 Units by mouth daily       ALLERGIES     Tetanus toxoids    FAMILYHISTORY     No family history on file. SOCIAL HISTORY       Social History     Tobacco Use    Smoking status: Never Smoker    Smokeless tobacco: Never Used   Substance Use Topics    Alcohol use: No    Drug use: No       SCREENINGS    Gile Coma Scale  Eye Opening: Spontaneous  Best Verbal Response: Oriented  Best Motor Response: Obeys commands  Gile Coma Scale Score: 15      PHYSICAL EXAM    (up to 7 for level 4, 8 or more for level 5)     ED Triage Vitals   BP Temp Temp Source Pulse Resp SpO2 Height Weight   04/22/22 1253 04/22/22 1245 04/22/22 1253 04/22/22 1253 04/22/22 1253 04/22/22 1253 04/22/22 1253 04/22/22 1245   119/68 97.8 °F (36.6 °C) Oral 139 17 98 % 5' 6\" (1.676 m) 172 lb 2.9 oz (78.1 kg)       Physical Exam  Vitals and nursing note reviewed. Constitutional:       General: She is not in acute distress. Appearance: Normal appearance. She is not ill-appearing. HENT:      Head: Normocephalic and atraumatic. Nose: Nose normal.   Eyes:      General:         Right eye: No discharge. Left eye: No discharge. Cardiovascular:      Rate and Rhythm: Tachycardia present. Pulmonary:      Effort: Pulmonary effort is normal. No respiratory distress. Musculoskeletal:         General: Normal range of motion. Cervical back: Normal range of motion. Skin:     General: Skin is warm and dry. Neurological:      General: No focal deficit present. Mental Status: She is alert and oriented to person, place, and time.    Psychiatric:         Mood and Affect: Mood normal.         Behavior: Behavior normal.         DIAGNOSTIC RESULTS LABS:    Labs Reviewed   CBC WITH AUTO DIFFERENTIAL - Abnormal; Notable for the following components:       Result Value    Hemoglobin 11.3 (*)     Hematocrit 34.3 (*)     RDW 15.5 (*)     MPV 11.5 (*)     Anisocytosis Occasional (*)     Macrocytes Occasional (*)     Microcytes Occasional (*)     Poikilocytes Occasional (*)     Ovalocytes Occasional (*)     All other components within normal limits   COMPREHENSIVE METABOLIC PANEL W/ REFLEX TO MG FOR LOW K - Abnormal; Notable for the following components:    Glucose 117 (*)     All other components within normal limits   TROPONIN   TSH WITH REFLEX       When ordered only abnormal lab results are displayed. All other labs were within normal range or not returned as of this dictation. EKG: When ordered, EKG's are interpreted by the Emergency Department Physician in the absence of a cardiologist.  Please see their note for interpretation of EKG. RADIOLOGY:   Non-plain film images such as CT, Ultrasound and MRI are read by the radiologist. Plain radiographic images are visualized and preliminarily interpreted by the ED Provider with the below findings:    Interpretation per the Radiologist below, if available at the time of this note:    No orders to display       CONSULTS:  None    PROCEDURES   Unless otherwise noted below, none. Procedures    EMERGENCY DEPARTMENT COURSE and DIFFERENTIAL DIAGNOSIS/MDM:   Vitals:    Vitals:    04/22/22 1456 04/22/22 1511 04/22/22 1515 04/22/22 1549   BP: (!) 133/52 (!) 121/58  117/60   Pulse: 89 87  80   Resp: 16 14  14   Temp:   98 °F (36.7 °C)    TempSrc:   Oral    SpO2:    100%   Weight:       Height:           Patient was given the following medications:  Medications   adenosine (ADENOCARD) injection 6 mg (6 mg IntraVENous Given 4/22/22 1423)   0.9 % sodium chloride bolus (0 mLs IntraVENous Stopped 4/22/22 1549)           The patient presented with a heart rate around 140, and EKG showed a narrow QRS and regular rhythm. Blood pressure was normal.  Patient denied any shortness of breath and was not hypoxic. Vagal maneuvers were unsuccessful. Patient was then given 6 mg of adenosine IV, and her heart rate went down to the 90s for a brief period, then back up to around 230, and then slowly came back down below 100. Thereafter the patient was observed for more than an hour in the ED and had a heart rate essentially around 80 and she was asymptomatic. Lab work-up showed no concerning abnormalities. There is no indication for hospitalization or further work-up. Patient requested a cardiology referral, and she will be given that and advised to follow-up with her primary care provider. She will also be advised to cut back on the caffeine, as she says she drinks 2 cups of her strong home-brewed coffee daily, and her  suggested she might be drinking more than that. The patient verbalized understanding and agreement with this plan of care. The patient was advised to return to the emergency department if symptoms should significantly worsen or if new and concerning symptoms should appear. I estimate there is LOW risk for ACUTE CORONARY SYNDROME, PULMONARY EMBOLISM, STROKE, TRANSIENT ISCHEMIC ATTACK, THORACIC AORTIC DISSECTION, HEMORRHAGE, OR CRITICAL CARDIAC ARRHYTHMIA, thus I consider the discharge disposition reasonable. CRITICAL CARE TIME   CRITICAL CARE: There was a high probability of clinically significant and/or life threatening deterioration in this patient's condition which required my urgent intervention. I independently provided 20 minutes of non-concurrent critical care out of the total shared critical care time provided. This excludes any time for separately reportable procedures. FINAL IMPRESSION      1. Paroxysmal supraventricular tachycardia ([address]    2.  Adverse effect of caffeine, initial encounter          DISPOSITION/PLAN   DISPOSITION Decision To Discharge 04/22/2022 03:50:33 PM      PATIENT REFERRED TO:  Humberto Du MD  [address] [facility] [address]  [phone]    Call in 1 week  For follow up appointment    Keyona Rondon MD  [address] [facility] [address] [facility] [place] [address]  [phone]    Call   as needed, for cardiology care      DISCHARGE MEDICATIONS:  New Prescriptions    No medications on file       DISCONTINUED MEDICATIONS:  Discontinued Medications    NAPROXEN (NAPROSYN) 375 MG TABLET    Take 1 tablet by mouth 2 times daily (with meals) for 10 days    POLYETHYLENE GLYCOL (MIRALAX) POWDER    Take 17 g by mouth daily as needed (constipation)            (Please note that portions of this note were completed with a voice recognition program.  Efforts were made to edit the dictations but occasionally words are mis-transcribed.)    SHERRILL Hayes (electronically signed)       SHERRILL Hayes  04/22/22 7809

## 2022-04-23 LAB
EKG ATRIAL RATE: 133 BPM
EKG DIAGNOSIS: NORMAL
EKG Q-T INTERVAL: 290 MS
EKG QRS DURATION: 78 MS
EKG QTC CALCULATION (BAZETT): 439 MS
EKG R AXIS: 24 DEGREES
EKG T AXIS: 40 DEGREES
EKG VENTRICULAR RATE: 138 BPM

## 2022-06-02 ENCOUNTER — HOSPITAL ENCOUNTER (OUTPATIENT)
Dept: MAMMOGRAPHY | Age: 62
Discharge: HOME | End: 2022-06-02
Payer: COMMERCIAL

## 2022-06-02 VITALS — HEIGHT: 66 IN | BODY MASS INDEX: 25.88 KG/M2 | WEIGHT: 161 LBS

## 2022-06-02 DIAGNOSIS — Z12.31 VISIT FOR SCREENING MAMMOGRAM: ICD-10-CM

## 2022-06-02 PROCEDURE — 77063 BREAST TOMOSYNTHESIS BI: CPT

## 2023-04-07 ENCOUNTER — APPOINTMENT (OUTPATIENT)
Dept: GENERAL RADIOLOGY | Age: 63
End: 2023-04-07
Payer: OTHER MISCELLANEOUS

## 2023-04-07 ENCOUNTER — HOSPITAL ENCOUNTER (EMERGENCY)
Age: 63
Discharge: HOME | End: 2023-04-07
Payer: OTHER MISCELLANEOUS

## 2023-04-07 VITALS
RESPIRATION RATE: 18 BRPM | WEIGHT: 166.45 LBS | DIASTOLIC BLOOD PRESSURE: 110 MMHG | BODY MASS INDEX: 26.87 KG/M2 | HEART RATE: 70 BPM | OXYGEN SATURATION: 99 % | TEMPERATURE: 98.1 F | SYSTOLIC BLOOD PRESSURE: 168 MMHG

## 2023-04-07 DIAGNOSIS — M25.461 KNEE EFFUSION, RIGHT: ICD-10-CM

## 2023-04-07 DIAGNOSIS — M17.11 OSTEOARTHRITIS OF RIGHT KNEE, UNSPECIFIED OSTEOARTHRITIS TYPE: ICD-10-CM

## 2023-04-07 DIAGNOSIS — M25.561 ACUTE PAIN OF RIGHT KNEE: Primary | ICD-10-CM

## 2023-04-07 PROCEDURE — 73560 X-RAY EXAM OF KNEE 1 OR 2: CPT

## 2023-04-07 PROCEDURE — 99283 EMERGENCY DEPT VISIT LOW MDM: CPT

## 2023-04-07 PROCEDURE — 6370000000 HC RX 637 (ALT 250 FOR IP): Performed by: NURSE PRACTITIONER

## 2023-04-07 RX ORDER — NAPROXEN 500 MG/1
500 TABLET ORAL 2 TIMES DAILY WITH MEALS
Qty: 28 TABLET | Refills: 0 | Status: SHIPPED | OUTPATIENT
Start: 2023-04-07 | End: 2023-04-21

## 2023-04-07 RX ORDER — NAPROXEN 250 MG/1
500 TABLET ORAL ONCE
Status: COMPLETED | OUTPATIENT
Start: 2023-04-07 | End: 2023-04-07

## 2023-04-07 RX ORDER — HYDROCODONE BITARTRATE AND ACETAMINOPHEN 5; 325 MG/1; MG/1
1 TABLET ORAL EVERY 6 HOURS PRN
Qty: 6 TABLET | Refills: 0 | Status: SHIPPED | OUTPATIENT
Start: 2023-04-07 | End: 2023-04-10

## 2023-04-07 RX ADMIN — NAPROXEN 500 MG: 250 TABLET ORAL at 15:17

## 2023-04-07 ASSESSMENT — PAIN - FUNCTIONAL ASSESSMENT
PAIN_FUNCTIONAL_ASSESSMENT: NONE - DENIES PAIN
PAIN_FUNCTIONAL_ASSESSMENT: 0-10

## 2023-04-07 ASSESSMENT — PAIN DESCRIPTION - ORIENTATION
ORIENTATION: RIGHT
ORIENTATION: RIGHT

## 2023-04-07 ASSESSMENT — PAIN SCALES - GENERAL
PAINLEVEL_OUTOF10: 10
PAINLEVEL_OUTOF10: 8

## 2023-04-07 ASSESSMENT — PAIN DESCRIPTION - LOCATION
LOCATION: KNEE
LOCATION: KNEE

## 2023-04-07 ASSESSMENT — PAIN DESCRIPTION - PAIN TYPE: TYPE: ACUTE PAIN

## 2023-04-07 ASSESSMENT — PAIN DESCRIPTION - DESCRIPTORS: DESCRIPTORS: PATIENT UNABLE TO DESCRIBE

## 2023-04-07 NOTE — Clinical Note
Tulio Blackburn was seen and treated in our emergency department on 4/7/2023. She may return to work on 04/10/2023. If you have any questions or concerns, please don't hesitate to call.       Garry Shearer, GABRIEL - CNP Cheiloplasty (Complex) Text: A decision was made to reconstruct the defect with a  cheiloplasty.  The defect was undermined extensively.  Additional obicularis oris muscle was excised with a 15 blade scalpel.  The defect was converted into a full thickness wedge to facilite a better cosmetic result.  Small vessels were then tied off with 5-0 monocyrl. The obicularis oris, superficial fascia, adipose and dermis were then reapproximated.  After the deeper layers were approximated the epidermis was reapproximated with particular care given to realign the vermilion border.

## 2023-04-07 NOTE — ED TRIAGE NOTES
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 Pt states that she heard a pop and it has been hurting ever since.

## 2023-04-12 PROBLEM — M17.11 PRIMARY OSTEOARTHRITIS OF RIGHT KNEE: Status: ACTIVE | Noted: 2023-04-12

## 2023-04-25 ENCOUNTER — TELEPHONE (OUTPATIENT)
Dept: ORTHOPEDIC SURGERY | Age: 63
End: 2023-04-25

## 2023-04-28 ENCOUNTER — TELEPHONE (OUTPATIENT)
Dept: ORTHOPEDIC SURGERY | Age: 63
End: 2023-04-28

## 2023-05-03 ENCOUNTER — OFFICE VISIT (OUTPATIENT)
Dept: ORTHOPEDIC SURGERY | Age: 63
End: 2023-05-03

## 2023-05-03 DIAGNOSIS — M17.11 PRIMARY OSTEOARTHRITIS OF RIGHT KNEE: Primary | ICD-10-CM

## 2023-05-03 NOTE — PROGRESS NOTES
CHIEF COMPLAINT: Right knee pain/osteoarthritis. DATE OF INJURY: 4/6/2023, Worker's Comp. HISTORY:  Ms. Parth Meza 61 y.o.  female presents today for f/u evaluation of right knee pain which started 4/6/2023 after she had a twisting injury at [facility] helping a patient. She had right knee cortisone injection on 4/12/2023 with mild improvement. 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 She went to [facility] ED on 4/7/2023. She is complaining of sharp pain, 5/10. Pain is increase with standing and walking and decrease with rest. Pain is sharp early in the morning with first few steps, dull achy pain by the end of the day. Alleviating factors: rest. No radiation and no numbness and tingling sensation. No other complaint. No h/o gout. Past Medical History:   Diagnosis Date    Headache(784.0)     Hypertension        Past Surgical History:   Procedure Laterality Date    CHOLECYSTECTOMY      SMALL INTESTINE SURGERY         Social History     Socioeconomic History    Marital status:      Spouse name: Not on file    Number of children: Not on file    Years of education: Not on file    Highest education level: Not on file   Occupational History    Not on file   Tobacco Use    Smoking status: Never    Smokeless tobacco: Never   Substance and Sexual Activity    Alcohol use: No    Drug use: No    Sexual activity: Not on file   Other Topics Concern    Not on file   Social History Narrative    Not on file     Social Determinants of Health     Financial Resource Strain: Not on file   Food Insecurity: Not on file   Transportation Needs: Not on file   Physical Activity: Not on file   Stress: Not on file   Social Connections: Not on file   Intimate Partner Violence: Not on file   Housing Stability: Not on file       History reviewed. No pertinent family history.     Current Outpatient Medications on File Prior to Visit   Medication Sig Dispense Refill    naproxen (NAPROSYN) 500 MG tablet Take 1 tablet

## 2023-05-11 ENCOUNTER — TELEPHONE (OUTPATIENT)
Dept: ORTHOPEDIC SURGERY | Age: 63
End: 2023-05-11

## 2023-05-16 ENCOUNTER — TELEPHONE (OUTPATIENT)
Dept: ORTHOPEDIC SURGERY | Age: 63
End: 2023-05-16

## 2023-05-24 ENCOUNTER — TELEPHONE (OUTPATIENT)
Dept: ORTHOPEDIC SURGERY | Age: 63
End: 2023-05-24

## 2023-05-24 NOTE — TELEPHONE ENCOUNTER
Attempted to reach patient to notify that [address] [place] [address] has now approved PT until 6/23/23. Patient can schedule at [facility] (p: [phone]), or wherever is convenient.

## 2023-05-31 ENCOUNTER — TELEPHONE (OUTPATIENT)
Dept: ORTHOPEDIC SURGERY | Age: 63
End: 2023-05-31

## 2023-05-31 NOTE — TELEPHONE ENCOUNTER
Pt. Aware she will need a follow up appt. In order for light duty to be extended/or approved. Pt. Aware and scheduled for 6-7-2023.

## 2023-05-31 NOTE — TELEPHONE ENCOUNTER
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

## 2023-06-07 ENCOUNTER — OFFICE VISIT (OUTPATIENT)
Dept: ORTHOPEDIC SURGERY | Age: 63
End: 2023-06-07

## 2023-06-07 VITALS — HEIGHT: 66 IN | BODY MASS INDEX: 26.68 KG/M2 | WEIGHT: 166 LBS

## 2023-06-07 DIAGNOSIS — M17.11 PRIMARY OSTEOARTHRITIS OF RIGHT KNEE: Primary | ICD-10-CM

## 2023-06-08 NOTE — PROGRESS NOTES
CHIEF COMPLAINT: Right knee pain/osteoarthritis. DATE OF INJURY: 4/6/2023, Worker's Comp. HISTORY:  Piedad Has 61 y.o.  female presents today for f/u evaluation of right knee pain which started 4/6/2023 after she had a twisting injury at [facility] helping a patient. She had right knee cortisone injection on 4/12/2023 with mild improvement. 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 She went to [facility] ED on 4/7/2023. She is complaining of sharp pain, 5/10. Pain is increase with standing and walking and decrease with rest. Pain is sharp early in the morning with first few steps, dull achy pain by the end of the day. Alleviating factors: rest. No radiation and no numbness and tingling sensation. No other complaint. No h/o gout. She has not yet gone to PT. Past Medical History:   Diagnosis Date    Headache(784.0)     Hypertension      Past Surgical History:   Procedure Laterality Date    CHOLECYSTECTOMY      SMALL INTESTINE SURGERY       No family history on file.   Social History     Socioeconomic History    Marital status:      Spouse name: Not on file    Number of children: Not on file    Years of education: Not on file    Highest education level: Not on file   Occupational History    Not on file   Tobacco Use    Smoking status: Never    Smokeless tobacco: Never   Substance and Sexual Activity    Alcohol use: No    Drug use: No    Sexual activity: Not on file   Other Topics Concern    Not on file   Social History Narrative    Not on file     Social Determinants of Health     Financial Resource Strain: Not on file   Food Insecurity: Not on file   Transportation Needs: Not on file   Physical Activity: Not on file   Stress: Not on file   Social Connections: Not on file   Intimate Partner Violence: Not on file   Housing Stability: Not on file     Current Outpatient Medications   Medication Sig Dispense Refill    naproxen (NAPROSYN) 500 MG tablet Take 1 tablet by mouth 2 times

## 2023-06-09 ENCOUNTER — HOSPITAL ENCOUNTER (OUTPATIENT)
Dept: PHYSICAL THERAPY | Age: 63
Setting detail: THERAPIES SERIES
Discharge: HOME | End: 2023-06-09
Payer: COMMERCIAL

## 2023-06-09 PROCEDURE — 97112 NEUROMUSCULAR REEDUCATION: CPT

## 2023-06-09 PROCEDURE — 97530 THERAPEUTIC ACTIVITIES: CPT

## 2023-06-09 PROCEDURE — 97161 PT EVAL LOW COMPLEX 20 MIN: CPT

## 2023-06-20 ENCOUNTER — HOSPITAL ENCOUNTER (OUTPATIENT)
Dept: PHYSICAL THERAPY | Age: 63
Setting detail: THERAPIES SERIES
Discharge: HOME | End: 2023-06-20
Payer: COMMERCIAL

## 2023-06-20 PROCEDURE — 97140 MANUAL THERAPY 1/> REGIONS: CPT

## 2023-06-20 PROCEDURE — 97112 NEUROMUSCULAR REEDUCATION: CPT

## 2023-06-20 PROCEDURE — 97110 THERAPEUTIC EXERCISES: CPT

## 2023-06-20 NOTE — FLOWSHEET NOTE
[address] [facility] [address] [place] [address]  Phone: [phone]   Fax:     [phone]      Date:  2023    Patient Name:  Ted Pisano    :  1960  MRN: [id number]    Pertinent Medical History: Additional Pertinent Hx: Hypertension    Medical/Treatment Diagnosis Information:  Medical Diagnosis: Primary osteoarthritis of right knee [M17.11]  Treatment Diagnosis: Decreased functional mobility, impaired tolerance to prolonged ambulation    Insurance/Certification information:  PT Insurance Information: [employer]  Physician Information:  Milind Dan MD  Plan of care signed (Y/N): sent 2023, received     Date of Patient follow up with Physician:      Progress Report: []  Yes  [x]  No     Date Range for reporting period:  Beginnin2023  Ending:     Progress report due (10 Rx/or 30 days whichever is less): 36     Recertification due (POC duration/ or 90 days whichever is less): 23     Visit # Insurance/POC Allowable Auth Needed     Until 23 [facility]  2-3x/week for 4-6 weeks []Yes    []No       Latex Allergy:  [x]NO      []YES  Preferred Language for Healthcare:   [x]English       []Other:    Functional Scale:        Test: [facility]  Date Assessed Score   23 11/80           Pain level:  4/10 at beginning of session    History of Injury:  Patient stated complaint: 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 She could not move her knee, so she went home. Overnight her knee was so swollen that she could not even walk, so she went to the ER. Patient reports that on 3/6 she was injured and went to the ER, was referred to Ortho. Patient states she was put on light duty and referred to PT. Patient reports that she was given medication and a brace, but the brace has not been helping.  Patient reports that her

## 2023-06-22 ENCOUNTER — HOSPITAL ENCOUNTER (OUTPATIENT)
Dept: PHYSICAL THERAPY | Age: 63
Setting detail: THERAPIES SERIES
Discharge: HOME | End: 2023-06-22
Payer: COMMERCIAL

## 2023-06-22 PROCEDURE — 97110 THERAPEUTIC EXERCISES: CPT

## 2023-06-22 PROCEDURE — 97140 MANUAL THERAPY 1/> REGIONS: CPT

## 2023-06-22 NOTE — FLOWSHEET NOTE
LS spine soft tissue/joints for the purpose of modulating pain, promoting relaxation,  increasing ROM, reducing/eliminating soft tissue swelling/inflammation/restriction, improving soft tissue extensibility and allowing for proper ROM for normal function with self care, mobility, lifting and ambulation. Modalities:  [] (23425) Vasopneumatic compression: Utilized vasopneumatic compression to decrease edema / swelling for the purpose of improving mobility and quad tone / recruitment which will allow for increased overall function including but not limited to self-care, transfers, ambulation, and ascending / descending stairs. If [facility] Please Indicate Time In/Out  CPT Code Time in Time out   17326 (low)     09233 (TE) 2:45 PM 3:20 PM   53072 (man) 3:25 PM 3:35 PM   17840 (NMR) 3:20 PM 3:25 PM   40016 (TA)                 Charges:  Timed Code Treatment Minutes: 50   Total Treatment Minutes: 48 (35 TE, 5 NMR, 10 man)      [] EVAL (LOW) 82463 (typically 20 minutes face-to-face)  [] EVAL (MOD) 21192 (typically 30 minutes face-to-face)  [] EVAL (HIGH) 68862 (typically 45 minutes face-to-face)  [] RE-EVAL     [x] XI(78150) x 2    [] Dry needle 1 or 2 Muscles (16703)  [] NMR (14573) x     [] Dry needle 3+ Muscles (25281)  [x] Manual (75583) x 1    [] Ultrasound (58606) x  [] TA (97180) x     [] Mech Traction (50872)  [] ES(attended) (77659)     [] ES (un) (96287):   [] Vasopump (22822) [] Ionto (43061)   [] Other:    GOALS:  Patient stated goal: get back to work  [] Progressing: [] Met: [] Not Met: [] Adjusted     Therapist goals for Patient:   Short Term Goals: To be achieved in: 2 weeks  1. Independent in HEP and progression per patient tolerance, in order to prevent re-injury. [] Progressing: [] Met: [] Not Met: [] Adjusted  2.  Patient will have a decrease in pain from 9/10 to maximum of 5/10 on VAS at rest and with activity to facilitate improved tolerance to movement, improved tolerance to functional mobility

## 2023-06-26 ENCOUNTER — HOSPITAL ENCOUNTER (OUTPATIENT)
Dept: PHYSICAL THERAPY | Age: 63
Setting detail: THERAPIES SERIES
Discharge: HOME | End: 2023-06-26
Payer: COMMERCIAL

## 2023-06-26 PROCEDURE — 97140 MANUAL THERAPY 1/> REGIONS: CPT

## 2023-06-26 PROCEDURE — 97110 THERAPEUTIC EXERCISES: CPT

## 2023-06-28 ENCOUNTER — HOSPITAL ENCOUNTER (OUTPATIENT)
Dept: WOMENS IMAGING | Age: 63
Discharge: HOME | End: 2023-06-28
Payer: COMMERCIAL

## 2023-06-28 ENCOUNTER — HOSPITAL ENCOUNTER (OUTPATIENT)
Dept: PHYSICAL THERAPY | Age: 63
Setting detail: THERAPIES SERIES
Discharge: HOME | End: 2023-06-28
Payer: COMMERCIAL

## 2023-06-28 DIAGNOSIS — Z12.31 BREAST CANCER SCREENING BY MAMMOGRAM: ICD-10-CM

## 2023-06-28 PROCEDURE — 77063 BREAST TOMOSYNTHESIS BI: CPT

## 2023-06-28 PROCEDURE — 97110 THERAPEUTIC EXERCISES: CPT

## 2023-07-03 ENCOUNTER — HOSPITAL ENCOUNTER (OUTPATIENT)
Dept: PHYSICAL THERAPY | Age: 63
Setting detail: THERAPIES SERIES
Discharge: HOME | End: 2023-07-03
Payer: COMMERCIAL

## 2023-07-03 PROCEDURE — 97110 THERAPEUTIC EXERCISES: CPT

## 2023-07-03 PROCEDURE — 97112 NEUROMUSCULAR REEDUCATION: CPT

## 2023-07-03 PROCEDURE — 97140 MANUAL THERAPY 1/> REGIONS: CPT

## 2023-07-03 NOTE — FLOWSHEET NOTE
[address] [address]  Phone: [phone]   Fax:     [phone]      Date:  2023    Patient Name:  Deyvi Blank    :  1960  MRN: [id number]    Pertinent Medical History: Additional Pertinent Hx: Hypertension    Medical/Treatment Diagnosis Information:  Medical Diagnosis: Primary osteoarthritis of right knee [M17.11]  Treatment Diagnosis: Decreased functional mobility, impaired tolerance to prolonged ambulation    Insurance/Certification information:  PT Insurance Information: [employer]  Physician Information:  Beena Cooper MD  Plan of care signed (Y/N): sent 2023, received     Date of Patient follow up with Physician:      Progress Report: []  Yes  [x]  No     Date Range for reporting period:  Beginnin2023  Ending:     Progress report due (10 Rx/or 30 days whichever is less): 11     Recertification due (POC duration/ or 90 days whichever is less): 23     Visit # Insurance/POC Allowable Auth Needed     Until 23 [facility]  2-3x/week for 4-6 weeks []Yes    []No       Latex Allergy:  [x]NO      []YES  Preferred Language for Healthcare:   [x]English       []Other:    Functional Scale:        Test: [facility]  Date Assessed Score   23 11/80           Pain level:  4/10 beginning of session    History of Injury:  Patient stated complaint: 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 She could not move her knee, so she went home. Overnight her knee was so swollen that she could not even walk, so she went to the ER. Patient reports that on 3/6 she was injured and went to the ER, was referred to Ortho. Patient states she was put on light duty and referred to PT. Patient reports that she was given medication and a brace, but the brace has not been helping.  Patient reports that her pain

## 2023-07-05 ENCOUNTER — OFFICE VISIT (OUTPATIENT)
Dept: ORTHOPEDIC SURGERY | Age: 63
End: 2023-07-05

## 2023-07-05 VITALS — BODY MASS INDEX: 26.68 KG/M2 | WEIGHT: 166 LBS | HEIGHT: 66 IN

## 2023-07-05 DIAGNOSIS — M17.11 PRIMARY OSTEOARTHRITIS OF RIGHT KNEE: Primary | ICD-10-CM

## 2023-07-07 ENCOUNTER — HOSPITAL ENCOUNTER (OUTPATIENT)
Dept: PHYSICAL THERAPY | Age: 63
Setting detail: THERAPIES SERIES
Discharge: HOME | End: 2023-07-07
Payer: COMMERCIAL

## 2023-07-07 PROCEDURE — 97112 NEUROMUSCULAR REEDUCATION: CPT

## 2023-07-07 PROCEDURE — 97110 THERAPEUTIC EXERCISES: CPT

## 2023-07-07 NOTE — PROGRESS NOTES
[address] [address]  Phone: [phone]   Fax:     [phone]      Date:  2023    Patient Name:  Elyse Real    :  1960  MRN: [id number]    Pertinent Medical History: Additional Pertinent Hx: Hypertension    Medical/Treatment Diagnosis Information:  Medical Diagnosis: Primary osteoarthritis of right knee [M17.11]  Treatment Diagnosis: Decreased functional mobility, impaired tolerance to prolonged ambulation    Insurance/Certification information:  PT Insurance Information: [employer]  Physician Information:  Juliana Unger MD  Plan of care signed (Y/N): sent 2023, received     Date of Patient follow up with Physician:      Progress Report: [x]  Yes  []  No     Date Range for reporting period:  Beginnin2023  Ending:     Progress report due (10 Rx/or 30 days whichever is less): 57, 16     Recertification due (POC duration/ or 90 days whichever is less): 23     Visit # Insurance/POC Allowable Auth Needed     Until 23 [address]  2-3x/week for 4-6 weeks []Yes    []No       Latex Allergy:  [x]NO      []YES  Preferred Language for Healthcare:   [x]English       []Other:    Functional Scale:        Test: LEFS  Date Assessed Score   23       Pain level:  4/10 beginning of session    History of Injury:  Patient stated complaint: 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 She could not move her knee, so she went home. Overnight her knee was so swollen that she could not even walk, so she went to the ER. Patient reports that on 3/6 she was injured and went to the ER, was referred to Ortho. Patient states she was put on light duty and referred to PT. Patient reports that she was given medication and a brace, but the brace has not been helping.  Patient

## 2023-07-07 NOTE — PROGRESS NOTES
CHIEF COMPLAINT: Right knee pain/osteoarthritis. DATE OF INJURY: 4/6/2023, Worker's Comp. HISTORY:  Ms. Micha Bar 61 y.o.  female presents today for f/u evaluation of right knee pain which started 4/6/2023 after she had a twisting injury at [facility] helping a patient. She had right knee cortisone injection on 4/12/2023 with mild improvement. 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 She went to [facility] ED on 4/7/2023. She is complaining of sharp pain, 4/10. Pain is increase with standing and walking and decrease with rest. Pain is sharp early in the morning with first few steps, dull achy pain by the end of the day. Alleviating factors: rest. No radiation and no numbness and tingling sensation. No other complaint. No h/o gout. Past Medical History:   Diagnosis Date    Headache(784.0)     Hypertension        Past Surgical History:   Procedure Laterality Date    CHOLECYSTECTOMY      SMALL INTESTINE SURGERY         Social History     Socioeconomic History    Marital status:      Spouse name: Not on file    Number of children: Not on file    Years of education: Not on file    Highest education level: Not on file   Occupational History    Not on file   Tobacco Use    Smoking status: Never    Smokeless tobacco: Never   Substance and Sexual Activity    Alcohol use: No    Drug use: No    Sexual activity: Not on file   Other Topics Concern    Not on file   Social History Narrative    Not on file     Social Determinants of Health     Financial Resource Strain: Not on file   Food Insecurity: Not on file   Transportation Needs: Not on file   Physical Activity: Not on file   Stress: Not on file   Social Connections: Not on file   Intimate Partner Violence: Not on file   Housing Stability: Not on file       No family history on file.     Current Outpatient Medications on File Prior to Visit   Medication Sig Dispense Refill    naproxen (NAPROSYN) 500 MG tablet Take 1 tablet by mouth 2 times

## 2023-07-11 ENCOUNTER — HOSPITAL ENCOUNTER (OUTPATIENT)
Dept: PHYSICAL THERAPY | Age: 63
Setting detail: THERAPIES SERIES
Discharge: HOME | End: 2023-07-11
Payer: COMMERCIAL

## 2023-07-11 PROCEDURE — 97112 NEUROMUSCULAR REEDUCATION: CPT

## 2023-07-11 PROCEDURE — 97110 THERAPEUTIC EXERCISES: CPT

## 2023-07-11 NOTE — FLOWSHEET NOTE
[address] [address]  Phone: [phone]   Fax:     [phone]      Date:  2023    Patient Name:  Sebastian Mcclain    :  1960  MRN: [id number]    Pertinent Medical History: Additional Pertinent Hx: Hypertension    Medical/Treatment Diagnosis Information:  Medical Diagnosis: Primary osteoarthritis of right knee [M17.11]  Treatment Diagnosis: Decreased functional mobility, impaired tolerance to prolonged ambulation    Insurance/Certification information:  PT Insurance Information: [employer] ([place])  Physician Information:  Betsey Fox MD  Plan of care signed (Y/N): sent 2023, received     Date of Patient follow up with Physician:      Progress Report: [x]  Yes  []  No     Date Range for reporting period:  Beginnin2023  Ending:     Progress report due (10 Rx/or 30 days whichever is less): 53, 85     Recertification due (POC duration/ or 90 days whichever is less): 23     Visit # Insurance/POC Allowable Auth Needed   10 /18  Until 23 [address]  2-3x/week for 4-6 weeks []Yes    []No       Latex Allergy:  [x]NO      []YES  Preferred Language for Healthcare:   [x]English       []Other:    Functional Scale:        Test: LEFS  Date Assessed Score   23               Pain level:  4/10 beginning of session    History of Injury:  Patient stated complaint: 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 She could not move her knee, so she went home. Overnight her knee was so swollen that she could not even walk, so she went to the ER. Patient reports that on 3/6 she was injured and went to the ER, was referred to Ortho. Patient states she was put on light duty and referred to PT. Patient reports that she was given medication and a brace, but the brace has not been helping.
[Follow-Up Visit] : a follow-up pain management visit

## 2023-07-13 ENCOUNTER — TELEPHONE (OUTPATIENT)
Dept: ORTHOPEDIC SURGERY | Age: 63
End: 2023-07-13

## 2023-07-13 ENCOUNTER — HOSPITAL ENCOUNTER (OUTPATIENT)
Dept: PHYSICAL THERAPY | Age: 63
Setting detail: THERAPIES SERIES
Discharge: HOME | End: 2023-07-13
Payer: COMMERCIAL

## 2023-07-13 PROCEDURE — 97110 THERAPEUTIC EXERCISES: CPT

## 2023-07-13 PROCEDURE — 97112 NEUROMUSCULAR REEDUCATION: CPT

## 2023-07-13 NOTE — FLOWSHEET NOTE
[address] [address]  Phone: [phone]   Fax:     [phone]      Date:  2023    Patient Name:  Latrice Jason    :  1960  MRN: [id number]    Pertinent Medical History: Additional Pertinent Hx: Hypertension    Medical/Treatment Diagnosis Information:  Medical Diagnosis: Primary osteoarthritis of right knee [M17.11]  Treatment Diagnosis: Decreased functional mobility, impaired tolerance to prolonged ambulation    Insurance/Certification information:  PT Insurance Information: [employer]  Physician Information:  Reece Berrios MD  Plan of care signed (Y/N): sent 2023, received     Date of Patient follow up with Physician:      Progress Report: [x]  Yes  []  No     Date Range for reporting period:  Beginnin2023  Ending:     Progress report due (10 Rx/or 30 days whichever is less): 29, 3/9/55     Recertification due (POC duration/ or 90 days whichever is less): 23     Visit # Insurance/POC Allowable Auth Needed     Until 23 [address]  2-3x/week for 4-6 weeks []Yes    []No       Latex Allergy:  [x]NO      []YES  Preferred Language for Healthcare:   [x]English       []Other:    Functional Scale:        Test: LEFS  Date Assessed Score   23               Pain level:  4/10 beginning of session    History of Injury:  Patient stated complaint: 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 She could not move her knee, so she went home. Overnight her knee was so swollen that she could not even walk, so she went to the ER. Patient reports that on 3/6 she was injured and went to the ER, was referred to Ortho. Patient states she was put on light duty and referred to PT. Patient reports that she was given medication and a brace, but the brace has not been helping.

## 2023-07-13 NOTE — TELEPHONE ENCOUNTER
[employer] form has been addressed. C9 request message sent to [facility] to add knee osteoarthritis to claim. Provider has never diagnosed or treated for the approved claim diagnosis of knee strain.

## 2023-07-13 NOTE — TELEPHONE ENCOUNTER
[id number]     CallerNorronal Baeza    Phone#: [phone]      Fax#: [phone]    [employer] -14  NEEDED Date of Service:  7/5/23  CLINIC IS TO [facility]

## 2023-07-18 ENCOUNTER — HOSPITAL ENCOUNTER (OUTPATIENT)
Dept: PHYSICAL THERAPY | Age: 63
Setting detail: THERAPIES SERIES
Discharge: HOME | End: 2023-07-18
Payer: COMMERCIAL

## 2023-07-18 PROCEDURE — 97110 THERAPEUTIC EXERCISES: CPT

## 2023-07-18 PROCEDURE — 97112 NEUROMUSCULAR REEDUCATION: CPT

## 2023-07-18 NOTE — FLOWSHEET NOTE
have any pain currently. Feels she is about 50% better.      OBJECTIVE:  Observation:   Test measurements:      ROM:  Date Knee Flexion AROM Knee Flexion PROM Knee Extension AROM Knee Extension PROM    L R L R L R L R   Eval 6/9/23  120 115   Lacking 3 0     7/7/23  123                                   Strength:  Date Hip flexion Hip Abduction Hip Extension Quad Hamstring Ankle Dorsiflexion Ankle PF    L R L R L R L R L R L R L R   Eval 6/9/23  3/5 3/5 3/5 3/5   4/5 4-/5 4-/5 3+/5       7/7/23 3/5 3/5     4/5 4/5 4-/5 4/5                                               RESTRICTIONS/PRECAUTIONS: None noted    Exercises/Interventions:     Therapeutic Ex (37989)   Min: 35 Reps/Resistance Completed  07/18/2023   Notes/CUES   Machines      Nustep Level 5, 5 min x    Leg press 2x15, 105 lbs x    Hamstring curl 2x10, 20 lbs x    LAQ  2x10, 10 lbs x Use of L LE to assist motion               Standing      Step ups 2x10, R only x 8 inch, patient with minimal form deviations/vaulting up on to step   Mini squats 2x15  Minimal increase in pain   Standing hip abd 2x10, blue t-band     Standing hip ext 2x10, blue t-band     Forward step downs 2x10, 6 inch, R only x Bilateral handrail with focus on controlled lowering         Seated      LAQ 2x10 bilat, 10 lb ankle weight     Hamstring curl 2x10 bilat, blue                 Supine            Stretching      Hamstring stretch 3x30 sec bilat x    Gastroc stretch incline 3x30 sec x    Quad stretch (prone) 3x30 sec R only x With strap   Hooklying figure 4 stretch 3x30 sec bilat x          Manual Intervention (30557)  Min:       Knee mobs/PROM      Tib/Fem Mobs      Patella Mobs Medial/lateral and superior/inferior     Ankle mobs      STM/trigger point release Quad, medial hamstring, lateral hamstring surrounding knee           NMR re-education (62005)  Min: 10   CUES NEEDED   Quad sets X10, 10 sec hold     Heel slide no strap X2 min, 5 sec hold  For hamstring strength and control   SLR

## 2023-07-21 ENCOUNTER — APPOINTMENT (OUTPATIENT)
Dept: PHYSICAL THERAPY | Age: 63
End: 2023-07-21
Payer: COMMERCIAL

## 2023-07-21 ENCOUNTER — HOSPITAL ENCOUNTER (OUTPATIENT)
Dept: PHYSICAL THERAPY | Age: 63
Setting detail: THERAPIES SERIES
Discharge: HOME | End: 2023-07-21
Payer: COMMERCIAL

## 2023-07-21 PROCEDURE — 97110 THERAPEUTIC EXERCISES: CPT

## 2023-07-21 PROCEDURE — 97112 NEUROMUSCULAR REEDUCATION: CPT

## 2023-07-21 NOTE — FLOWSHEET NOTE
eccentric quad control. Patient would benefit from continued skilled physical therapy to improve lower extremity flexibility and strength in order to decrease pain and improve tolerance to ADLs and functional activities. Treatment/Activity Tolerance:  [x] Patient tolerated treatment well [] Patient limited by fatique  [] Patient limited by pain  [] Patient limited by other medical complications  [] Other:     Overall Progression Towards Functional goals/ Treatment Progress Update:  [] Patient is progressing as expected towards functional goals listed. [x] Progression is slowed due to complexities/Impairments listed. [] Progression has been slowed due to co-morbidities. [] Plan just implemented, too soon to assess goals progression <30days   [] Goals require adjustment due to lack of progress  [] Patient is not progressing as expected and requires additional follow up with physician  [] Other    Prognosis for POC: [x] Good [] Fair  [] Poor    Patient requires continued skilled intervention: [x] Yes  [] No        PLAN: 1-2x/week for 6-8 weeks  [x] Continue per plan of care [] Alter current plan (see comments)  [] Plan of care initiated [] Hold pending MD visit [] Discharge    Electronically signed by: Tomas Chew, PT, DPT    Note: If patient does not return for scheduled/recommended follow up visits, this note will serve as a discharge from care along with the most recent update on progress.

## 2023-07-25 ENCOUNTER — HOSPITAL ENCOUNTER (OUTPATIENT)
Dept: PHYSICAL THERAPY | Age: 63
Setting detail: THERAPIES SERIES
Discharge: HOME | End: 2023-07-25
Payer: COMMERCIAL

## 2023-07-25 PROCEDURE — 97110 THERAPEUTIC EXERCISES: CPT

## 2023-07-25 PROCEDURE — 97112 NEUROMUSCULAR REEDUCATION: CPT

## 2023-07-25 NOTE — FLOWSHEET NOTE
for proper ROM for normal function with self care, mobility, lifting and ambulation. Modalities:  [] (98438) Vasopneumatic compression: Utilized vasopneumatic compression to decrease edema / swelling for the purpose of improving mobility and quad tone / recruitment which will allow for increased overall function including but not limited to self-care, transfers, ambulation, and ascending / descending stairs. If BWC Please Indicate Time In/Out  CPT Code Time in Time out   92287 (low)     80255 (TE) 2:05 PM 2:35 PM   [id number] (man)     [id number] (NMR) 2:35 PM 2:45 PM   93130 (TA)                 Charges:  Timed Code Treatment Minutes: 40   Total Treatment Minutes: 40 (30 TE, 10 NMR)      [] EVAL (LOW) 64926 (typically 20 minutes face-to-face)  [] EVAL (MOD) 09861 (typically 30 minutes face-to-face)  [] EVAL (HIGH) 17897 (typically 45 minutes face-to-face)  [] RE-EVAL     [x] LP(65851) x 2    [] Dry needle 1 or 2 Muscles (18594)  [x] NMR (26002) x 1    [] Dry needle 3+ Muscles (17017)  [] Manual (83455) x     [] Ultrasound (31732) x  [] TA (96232) x     [] Mech Traction (28091)  [] ES(attended) (19701)     [] ES (un) (14936):   [] Vasopump (52820) [] Ionto (18860)   [] Other:    GOALS:  Patient stated goal: get back to work  [x] Progressing: [] Met: [] Not Met: [] Adjusted     Therapist goals for Patient:   Short Term Goals: To be achieved in: 2 weeks  1. Independent in HEP and progression per patient tolerance, in order to prevent re-injury. [] Progressing: [x] Met: [] Not Met: [] Adjusted  2. Patient will have a decrease in pain from 9/10 to maximum of 5/10 on VAS at rest and with activity to facilitate improved tolerance to movement, improved tolerance to functional mobility tasks such as ambulation at home and within the community, and improved tolerance to ADLs including all work activities. [x] Progressing: [] Met: [] Not Met: [] Adjusted     Long Term Goals: To be achieved in: 8 weeks  1.  Increase LEFS

## 2023-07-27 ENCOUNTER — HOSPITAL ENCOUNTER (OUTPATIENT)
Dept: PHYSICAL THERAPY | Age: 63
Setting detail: THERAPIES SERIES
Discharge: HOME | End: 2023-07-27
Payer: COMMERCIAL

## 2023-07-27 PROCEDURE — 97112 NEUROMUSCULAR REEDUCATION: CPT

## 2023-07-27 PROCEDURE — 97110 THERAPEUTIC EXERCISES: CPT

## 2023-07-27 NOTE — FLOWSHEET NOTE
and improved tolerance to ADLs including all work activities. [x] Progressing: [] Met: [] Not Met: [] Adjusted     Long Term Goals: To be achieved in: 8 weeks  1. Increase LEFS functional outcome score from 11/80 to 35/80 to assist with reaching prior level of function. [x] Progressing: [] Met: [] Not Met: [] Adjusted  2. Patient will demonstrate increased AROM to 130 degrees knee flexion to allow for proper joint functioning during functional mobility to improve tolerance to work tasks. [x] Progressing: [] Met: [] Not Met: [] Adjusted  3. Patient will demonstrate an increase in strength to 4+/5 global hip and knee musculature to allow for proper functional mobility and improved tolerance to work tasks as indicated by patients Functional Deficits. [x] Progressing: [] Met: [] Not Met: [] Adjusted  4. Patient will return to full work duties without increased symptoms or restriction. [x] Progressing: [] Met: [] Not Met: [] Adjusted    ASSESSMENT: Patient demonstrates improving eccentric quad control through performance on forward tap-downs this date, able to complete with bilateral UE assist with only minimal increase in pain. Patient continues to ambulate with antalgic gait, however decreased stance time is noted not on R LE but on L LE secondary to pre-existing arthritis prior to injury. Patient reports that she has minimal to no pain in R LE with ambulation. Patient continues to make good progress with current exercise program, continue to progress as able with a focus on transitioning to HEP. Patient would benefit from continued skilled physical therapy to improve lower extremity flexibility and strength in order to decrease pain and improve tolerance to ADLs and work-related functional activities.      Treatment/Activity Tolerance:  [x] Patient tolerated treatment well [] Patient limited by fatique  [] Patient limited by pain  [] Patient limited by other medical complications  [] Other:     Overall

## 2023-07-28 ENCOUNTER — APPOINTMENT (OUTPATIENT)
Dept: PHYSICAL THERAPY | Age: 63
End: 2023-07-28
Payer: COMMERCIAL

## 2023-08-01 ENCOUNTER — HOSPITAL ENCOUNTER (OUTPATIENT)
Dept: PHYSICAL THERAPY | Age: 63
Setting detail: THERAPIES SERIES
Discharge: HOME | End: 2023-08-01
Payer: COMMERCIAL

## 2023-08-01 ENCOUNTER — APPOINTMENT (OUTPATIENT)
Dept: PHYSICAL THERAPY | Age: 63
End: 2023-08-01
Payer: COMMERCIAL

## 2023-08-01 PROCEDURE — 97110 THERAPEUTIC EXERCISES: CPT

## 2023-08-01 PROCEDURE — 97112 NEUROMUSCULAR REEDUCATION: CPT

## 2023-08-01 NOTE — FLOWSHEET NOTE
co-morbidities. [] Plan just implemented, too soon to assess goals progression <30days   [] Goals require adjustment due to lack of progress  [] Patient is not progressing as expected and requires additional follow up with physician  [] Other    Prognosis for POC: [x] Good [] Fair  [] Poor    Patient requires continued skilled intervention: [x] Yes  [] No        PLAN: 1-2x/week for 6-8 weeks  [x] Continue per plan of care [] Alter current plan (see comments)  [] Plan of care initiated [] Hold pending MD visit [] Discharge    Electronically signed by: Khushi Shipley, PTA [id number]    Note: If patient does not return for scheduled/recommended follow up visits, this note will serve as a discharge from care along with the most recent update on progress.

## 2023-08-03 ENCOUNTER — HOSPITAL ENCOUNTER (OUTPATIENT)
Dept: PHYSICAL THERAPY | Age: 63
Setting detail: THERAPIES SERIES
Discharge: HOME | End: 2023-08-03
Payer: COMMERCIAL

## 2023-08-03 PROCEDURE — 97110 THERAPEUTIC EXERCISES: CPT

## 2023-08-03 PROCEDURE — 97112 NEUROMUSCULAR REEDUCATION: CPT

## 2023-08-11 ENCOUNTER — HOSPITAL ENCOUNTER (OUTPATIENT)
Dept: MRI IMAGING | Age: 63
Discharge: HOME | End: 2023-08-11
Attending: ORTHOPAEDIC SURGERY
Payer: COMMERCIAL

## 2023-08-11 DIAGNOSIS — M17.11 PRIMARY OSTEOARTHRITIS OF RIGHT KNEE: ICD-10-CM

## 2023-08-11 PROCEDURE — 73721 MRI JNT OF LWR EXTRE W/O DYE: CPT

## 2023-08-18 ENCOUNTER — OFFICE VISIT (OUTPATIENT)
Dept: ORTHOPEDIC SURGERY | Age: 63
End: 2023-08-18

## 2023-08-18 VITALS — HEIGHT: 66 IN | WEIGHT: 166 LBS | BODY MASS INDEX: 26.68 KG/M2

## 2023-08-18 DIAGNOSIS — S83.241A TEAR OF MEDIAL MENISCUS OF RIGHT KNEE, UNSPECIFIED TEAR TYPE, UNSPECIFIED WHETHER OLD OR CURRENT TEAR, INITIAL ENCOUNTER: ICD-10-CM

## 2023-08-18 DIAGNOSIS — M17.11 PRIMARY OSTEOARTHRITIS OF RIGHT KNEE: Primary | ICD-10-CM

## 2023-08-20 PROBLEM — S83.241A TEAR OF MEDIAL MENISCUS OF RIGHT KNEE: Status: ACTIVE | Noted: 2023-08-20

## 2023-08-30 ENCOUNTER — TELEPHONE (OUTPATIENT)
Dept: ORTHOPEDIC SURGERY | Age: 63
End: 2023-08-30

## 2023-08-30 NOTE — TELEPHONE ENCOUNTER
C9 APPROVED FOR PHYSICAL THERAPY RIGHT KNEE 2-3 X'S PER WEEK 4 WEEKS UP TO 12 VISITS 08-- 09-     Patient notified

## 2023-09-01 ENCOUNTER — HOSPITAL ENCOUNTER (OUTPATIENT)
Dept: PHYSICAL THERAPY | Age: 63
Setting detail: THERAPIES SERIES
Discharge: HOME | End: 2023-09-01
Attending: ORTHOPAEDIC SURGERY
Payer: COMMERCIAL

## 2023-09-01 PROCEDURE — 97110 THERAPEUTIC EXERCISES: CPT

## 2023-09-01 PROCEDURE — 97112 NEUROMUSCULAR REEDUCATION: CPT

## 2023-09-01 NOTE — PLAN OF CARE
reports that she continues to have pain on the inside aspect of her knee. It is improving but is still about 4/10 pain level. Reports that yesterday she had trouble sleeping because of the pain in the side of her knee. 7/7/23: Patient reports that she had a follow-up with ortho earlier this week. Plan is to get an MRI. Her knee was feeling pretty good up until yesterday, but yesterday it was hurting her a little bit more. 7/11/23: Patient reports knee isn't feeling too bad today. Has not scheduled her MRI yet. 7/13/23: Patient reports that her knee is feeling much better. She is able to go up and down stairs a little bit more easily. 7/18/23: Patient reports that her knee is doing really well, does not have any pain currently. Feels she is about 50% better. 7/21/23: Patient reports that her knee continues to improve, doing better each day. 7/25/23: Patient reports that her knee is still a little achey. Is going to schedule MRI after she is done with PT session. 7/27/23: Patient reports that she has no new complaints, knee is feeling pretty good today. Has MRI scheduled for 8/11/23.  08/01/23: Patient reports  her right knee is feeling pretty good,just a little achy. 8/3/23: Patient reports her knee is doing good. Still has a little trouble with walking for prolonged periods, but doing much better. MRI 8/13/23: IMPRESSION:  Radial tear at the junction of the posterior horn and root of the medial  meniscus with associated extrusion. Advanced patellofemoral and moderate to advanced medial compartment  chondrosis. Small to moderate effusion. Bone marrow edema in the lateral femoral condyle, likely reactive versus  contusion. Edema throughout Hoffa's fat pad could be reactive versus secondary to  patellar maltracking. Chart review:   Saw Dr. Nalani Fleischer on 8/18/23 to review MRI results. Plan for continued physical therapy x4 weeks with light duty at work x4 weeks.  Potential surgical

## 2023-09-06 ENCOUNTER — HOSPITAL ENCOUNTER (OUTPATIENT)
Dept: PHYSICAL THERAPY | Age: 63
Setting detail: THERAPIES SERIES
Discharge: HOME | End: 2023-09-06
Attending: ORTHOPAEDIC SURGERY
Payer: COMMERCIAL

## 2023-09-06 PROCEDURE — 97112 NEUROMUSCULAR REEDUCATION: CPT

## 2023-09-06 PROCEDURE — 97110 THERAPEUTIC EXERCISES: CPT

## 2023-09-06 NOTE — FLOWSHEET NOTE
only     TKE X2 min, 5 sec holds, black x    Bridge 2x15 x    Reformer bridges HOLD  Hold secondary to dizziness with exercise   SLS 3x30 sec x    Lateral tap downs 2x10 R only, 4 inch x    Forward tap downs 2x10 R only, 4 inch x          Ambulation With SPC decreased antalgic gait, improved step length and stance time                 Therapeutic Activity (25917)  Min:       Patient education POC, diagnosis, prognosis, purpose of PT tests and measures, goals of therapy, importance of compliance with HEP, compliance/attendance policy, answered all patient questions                       Modalities  Min:      IFC with       CP after exercises      MH after exercises                GAMEREADY:  GIRTH L R POST VASO   Mid Patella      5 cm above      Infra Patella      Mid Calf      Malleoli          Other Therapeutic Activities: Pt was educated on PT POC, Diagnosis, Prognosis, pathomechanics as well as frequency and duration of scheduling future physical therapy appointments. Time was also taken on this day to answer all patient questions and participation in PT. Reviewed appointment policy in detail with patient and patient verbalized understanding. Home Exercise Program: Patient was instructed in the following for HEP:    Access Code: [email or web address]  URL: [email or web address]  Date: 06/09/2023  Prepared by: Kylah Bird     Exercises  - Supine Quad Set  - 2 x daily - 7 x weekly - 1 sets - 10 reps - 5 sec hold  - Small Range Straight Leg Raise  - 2 x daily - 7 x weekly - 1 sets - 10 reps  - Clamshell  - 2 x daily - 7 x weekly - 1 sets - 10 reps  - Supine Heel Slide with Strap  - 2 x daily - 7 x weekly - 1 sets - 2 min hold     Patient verbalized/demonstrated understanding and was issued written handout.       Therapeutic Exercise and NMR EXR  [x] (48337) Provided verbal/tactile cueing for activities related to strengthening, flexibility, endurance, ROM for improvements in LE, proximal hip, and core control with

## 2023-09-08 ENCOUNTER — TELEPHONE (OUTPATIENT)
Dept: ORTHOPEDIC SURGERY | Age: 63
End: 2023-09-08

## 2023-09-08 NOTE — TELEPHONE ENCOUNTER
ADDITIONAL DX FOR WC CLAIM:    [facility] [employer]  CALLED ASKING IF WE WANT TO AMEND CLAIM FOR MMT SHOWN ON RECENT MRI FOR RIGHT KNEE. IF SO, PLEASE SEND MSG TO  DEPT.

## 2023-09-11 NOTE — TELEPHONE ENCOUNTER
Message sent to [facility] to submit C9 to add below dx to claim.      Tear of medial meniscus of right knee, unspecified tear type, unspecified whether old or current tear, initial encounter Sally Shrestha

## 2023-09-12 ENCOUNTER — APPOINTMENT (OUTPATIENT)
Dept: PHYSICAL THERAPY | Age: 63
End: 2023-09-12
Attending: ORTHOPAEDIC SURGERY
Payer: COMMERCIAL

## 2023-09-13 ENCOUNTER — HOSPITAL ENCOUNTER (OUTPATIENT)
Dept: PHYSICAL THERAPY | Age: 63
Setting detail: THERAPIES SERIES
Discharge: HOME | End: 2023-09-13
Attending: ORTHOPAEDIC SURGERY
Payer: COMMERCIAL

## 2023-09-13 PROCEDURE — 97110 THERAPEUTIC EXERCISES: CPT

## 2023-09-13 PROCEDURE — 97112 NEUROMUSCULAR REEDUCATION: CPT

## 2023-09-13 NOTE — FLOWSHEET NOTE
[] Progressing: [x] Met: [] Not Met: [] Adjusted     Long Term Goals: To be achieved in: 8 weeks  1. Increase LEFS functional outcome score from 11/80 to 35/80 to assist with reaching prior level of function. [] Progressing: [x] Met: [] Not Met: [] Adjusted  2. Patient will demonstrate increased AROM to 130 degrees knee flexion to allow for proper joint functioning during functional mobility to improve tolerance to work tasks. [x] Progressin active ROM[] Met: [] Not Met: [] Adjusted  3. Patient will demonstrate an increase in strength to 4+/5 global hip and knee musculature to allow for proper functional mobility and improved tolerance to work tasks as indicated by patients Functional Deficits. [x] Progressing: [] Met: [] Not Met: [] Adjusted  4. Patient will return to full work duties without increased symptoms or restriction. [x] Progressing: partial duties [] Met: [] Not Met: [] Adjusted  5. Increase LEFS functional outcome score to 50/80 to assist with reaching prior level of function. [] Progressing: [] Met: [] Not Met: [x] Adjusted    ASSESSMENT: Patient demonstrates fair tolerance to addition of sit to stands, unable to complete without upper extremity assist indicating functional quad and hip weakness. When performing stand to sit, patient demonstrates impaired quad control through forward flexion until bilateral UE are on arms of chair, does not flex knees until UE support is present. Significant UE support is required for controlled descent. Continue to progress as able with a focus on quad strength and control, especially closed chain functional quad strengthening exercises. Patient would benefit from continued skilled physical therapy in order to improve lower extremity strength to decrease pain and facilitate full return to work related activities.      Treatment/Activity Tolerance:  [x] Patient tolerated treatment well [] Patient limited by fatique  [] Patient limited by pain  []

## 2023-09-14 ENCOUNTER — APPOINTMENT (OUTPATIENT)
Dept: PHYSICAL THERAPY | Age: 63
End: 2023-09-14
Attending: ORTHOPAEDIC SURGERY
Payer: COMMERCIAL

## 2023-09-15 ENCOUNTER — HOSPITAL ENCOUNTER (OUTPATIENT)
Dept: PHYSICAL THERAPY | Age: 63
Setting detail: THERAPIES SERIES
Discharge: HOME | End: 2023-09-15
Attending: ORTHOPAEDIC SURGERY
Payer: COMMERCIAL

## 2023-09-15 PROCEDURE — 97112 NEUROMUSCULAR REEDUCATION: CPT

## 2023-09-15 PROCEDURE — 97110 THERAPEUTIC EXERCISES: CPT

## 2023-09-15 NOTE — FLOWSHEET NOTE
[address] [address]  Phone: [phone]   Fax:     [phone]       Date:  09/15/2023    Patient Name:  Cory Mccall    :  1960  MRN: [id number]    Pertinent Medical History: Additional Pertinent Hx: Hypertension    Medical/Treatment Diagnosis Information:  Medical Diagnosis: Primary osteoarthritis of right knee [M17.11]  Treatment Diagnosis: Decreased functional mobility, impaired tolerance to prolonged ambulation    Insurance/Certification information:  PT Insurance Information: [employer]  Physician Information:  Dorene Monk MD  Plan of care signed (Y/N): sent 2023, received     Date of Patient follow up with Physician:      Progress Report: [x]  Yes  []  No     Date Range for reporting period:  Beginnin2023  Ending:     Progress report due (10 Rx/or 30 days whichever is less): 23, 73,     Recertification due (POC duration/ or 90 days whichever is less): 23, 10/1/23    Visit # Insurance/POC Allowable Auth Needed   21 total     until 23     17/18  Until 23 BWC      2-3x/week for 4 weeks        2-3x/week for 4-6 weeks []Yes    []No       Latex Allergy:  [x]NO      []YES  Preferred Language for Healthcare:   [x]English       []Other:    Functional Scale:        Test: LEFS  Date Assessed Score   6/9/23 11/80   7/7/23 29/80   8/3/23 3723 3380           Pain level:  0/10 beginning of session, 4/10 max in the last week    History of Injury:  Patient stated complaint: 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 She could not move her knee, so she went home. Overnight her knee was so swollen that she could not even walk, so she went to the ER. Patient reports that on 3/6 she was injured and went to the ER, was referred to Ortho.  Patient states she was

## 2023-09-19 ENCOUNTER — HOSPITAL ENCOUNTER (OUTPATIENT)
Dept: PHYSICAL THERAPY | Age: 63
Setting detail: THERAPIES SERIES
Discharge: HOME | End: 2023-09-19
Attending: ORTHOPAEDIC SURGERY
Payer: COMMERCIAL

## 2023-09-19 PROCEDURE — 97110 THERAPEUTIC EXERCISES: CPT

## 2023-09-19 PROCEDURE — 97112 NEUROMUSCULAR REEDUCATION: CPT

## 2023-09-19 NOTE — FLOWSHEET NOTE
put on light duty and referred to PT. Patient reports that she was given medication and a brace, but the brace has not been helping. Patient reports that her pain is in her medial knee. At night she has a hard time sleeping. SUBJECTIVE:  6/13/23: Patient reports she was off work for 3 days, just walking around the house. Has been icing her knee. Still has pain on the medial aspect of her knee when she moves. 6/16/23: Patient reports that she had to take a day off work again because her knee hurt. She has pain primarily on the inside and outside of her knee (points to medial and lateral joint lines). 6/20/23: Started 15 minutes early. Patient reports she is still getting a lot of pain in her knee, especially the medial aspect when walking and standing. Does not have much pain at rest, but the pain is present when moving around. Walked around a lot at work today and so her knee is pretty sore/painful.  6/22/23: Patient reports she felt like she was able to walk around a little better after the last session. Still having some pain, but staying at a 4-5/10 on the pain scale. 6/26/23: Patient reports that she still has pain on inside of knee with standing, but intensity is decreasing. Walking is getting easier. Overall feels like she is improving.    6/28/23: Patient reports that her knee is still improving slowly. 7/3/23: Patient reports that she continues to have pain on the inside aspect of her knee. It is improving but is still about 4/10 pain level. Reports that yesterday she had trouble sleeping because of the pain in the side of her knee. 7/7/23: Patient reports that she had a follow-up with ortho earlier this week. Plan is to get an MRI. Her knee was feeling pretty good up until yesterday, but yesterday it was hurting her a little bit more. 7/11/23: Patient reports knee isn't feeling too bad today. Has not scheduled her MRI yet. 7/13/23: Patient reports that her knee is feeling much better.  She

## 2023-09-22 ENCOUNTER — HOSPITAL ENCOUNTER (OUTPATIENT)
Dept: PHYSICAL THERAPY | Age: 63
Setting detail: THERAPIES SERIES
Discharge: HOME | End: 2023-09-22
Attending: ORTHOPAEDIC SURGERY
Payer: COMMERCIAL

## 2023-09-22 PROCEDURE — 97110 THERAPEUTIC EXERCISES: CPT

## 2023-09-22 PROCEDURE — 97112 NEUROMUSCULAR REEDUCATION: CPT

## 2023-09-22 NOTE — FLOWSHEET NOTE
[id number] [facility], [address] [place] [address]  Phone: [phone]   Fax:     [phone]       Date:  2023    Patient Name:  Candelario Rico    :  1960  MRN: [id number]    Pertinent Medical History: Additional Pertinent Hx: Hypertension    Medical/Treatment Diagnosis Information:  Medical Diagnosis: Primary osteoarthritis of right knee [M17.11]  Treatment Diagnosis: Decreased functional mobility, impaired tolerance to prolonged ambulation    Insurance/Certification information:  PT Insurance Information: [employer] Delikatesser  Physician Information:  Wilfredo Banerjee MD  Plan of care signed (Y/N): sent 2023, received     Date of Patient follow up with Physician:      Progress Report: [x]  Yes  []  No     Date Range for reporting period:  Beginnin2023  Ending:     Progress report due (10 Rx/or 30 days whichever is less): 23, 19, 54    Recertification due (POC duration/ or 90 days whichever is less): 23, 10/1/23    Visit # Insurance/POC Allowable Auth Needed   23 total     until 23  Until 23 BWC      2-3x/week for 4 weeks        2-3x/week for 4-6 weeks []Yes    []No       Latex Allergy:  [x]NO      []YES  Preferred Language for Healthcare:   [x]English       []Other:    Functional Scale:        Test: LEFS  Date Assessed Score   6/9/23 11/80   7/7/23 29/80   8/3/23 37/80   9/1/23 3380           Pain level:  0/10 beginning of session, 4/10 max in the last week    History of Injury:  Patient stated complaint: 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 She could not move her knee, so she went home. Overnight her knee was so swollen that she could not even walk, so she went to the ER. Patient reports that on 3/6 she was injured and went to the ER, was referred to Ortho.  Patient states she was

## 2023-09-25 ENCOUNTER — HOSPITAL ENCOUNTER (OUTPATIENT)
Dept: PHYSICAL THERAPY | Age: 63
Setting detail: THERAPIES SERIES
Discharge: HOME | End: 2023-09-25
Attending: ORTHOPAEDIC SURGERY
Payer: COMMERCIAL

## 2023-09-25 PROCEDURE — 97110 THERAPEUTIC EXERCISES: CPT

## 2023-09-25 PROCEDURE — 97112 NEUROMUSCULAR REEDUCATION: CPT

## 2023-09-25 NOTE — FLOWSHEET NOTE
first) 2x10, 8 inch, R only x Unilateral handrail with focus on controlled lowering         Seated      LAQ 2x10 bilat, 10 lb ankle weight     Hamstring curl 2x10 bilat, blue                 Supine            Stretching      Hamstring stretch 3x30 sec bilat x    Gastroc stretch incline 3x30 sec x    Quad stretch (prone) 3x30 sec R only  With strap   Hooklying figure 4 stretch 3x30 sec bilat     Tests/measures As above           Manual Intervention (08608)  Min:       Knee mobs/PROM      Tib/Fem Mobs      Patella Mobs Medial/lateral and superior/inferior     Ankle mobs      STM/trigger point release Quad, medial hamstring, lateral hamstring surrounding knee           NMR re-education (27492)  Min: 10   CUES NEEDED   Quad sets X10, 10 sec hold     Heel slide no strap X2 min, 5 sec hold  For hamstring strength and control   SLR 2x10 bilat     Clamshells 2x10, L only     TKE X2 min, 5 sec holds, black x    Bridge 2x15     Reformer bridges HOLD  Hold secondary to dizziness with exercise   SLS 3x30 sec     Lateral tap downs 2x10 R only, 8 inch x    Forward tap downs 2x10 R only, 6 inch x    Sit to stands 2x15 x Sit to stand with hands on legs, stand to sit reaching back for arms of chair  Cueing for slow controlled movements   SAQ 2x10, 5 sec holds, 5 lbs x          Ambulation With SPC decreased antalgic gait, improved step length and stance time                 Therapeutic Activity (53635)  Min:       Patient education POC, diagnosis, prognosis, purpose of PT tests and measures, goals of therapy, importance of compliance with HEP, compliance/attendance policy, answered all patient questions                       Modalities  Min:      IFC with       CP after exercises      MH after exercises                GAMEREADY:  GIRTH L R POST VASO   Mid Patella      5 cm above      Infra Patella      Mid Calf      Malleoli          Other Therapeutic Activities: Pt was educated on PT POC, Diagnosis, Prognosis, pathomechanics as well

## 2023-09-27 ENCOUNTER — APPOINTMENT (OUTPATIENT)
Dept: PHYSICAL THERAPY | Age: 63
End: 2023-09-27
Attending: ORTHOPAEDIC SURGERY
Payer: COMMERCIAL

## 2023-09-28 ENCOUNTER — HOSPITAL ENCOUNTER (OUTPATIENT)
Dept: PHYSICAL THERAPY | Age: 63
Setting detail: THERAPIES SERIES
Discharge: HOME | End: 2023-09-28
Attending: ORTHOPAEDIC SURGERY
Payer: COMMERCIAL

## 2023-09-28 PROCEDURE — 97112 NEUROMUSCULAR REEDUCATION: CPT

## 2023-09-28 PROCEDURE — 97110 THERAPEUTIC EXERCISES: CPT

## 2023-09-28 NOTE — PLAN OF CARE
face-to-face)  [] RE-EVAL     [x] IJ(74207) x 2    [] Dry needle 1 or 2 Muscles (49773)  [x] NMR (14065) x 1    [] Dry needle 3+ Muscles (80502)  [] Manual (70410) x     [] Ultrasound (90113) x  [] TA (47937) x     [] Mech Traction (76352)  [] ES(attended) (96251)     [] ES (un) (94665):   [] Vasopump (53790) [] Ionto (28780)   [] Other:    GOALS:  Patient stated goal: get back to work  [x] Progressing: [] Met: [] Not Met: [] Adjusted     Therapist goals for Patient:   Short Term Goals: To be achieved in: 2 weeks  1. Independent in HEP and progression per patient tolerance, in order to prevent re-injury. [] Progressing: [x] Met: [] Not Met: [] Adjusted  2. Patient will have a decrease in pain from 9/10 to maximum of 5/10 on VAS at rest and with activity to facilitate improved tolerance to movement, improved tolerance to functional mobility tasks such as ambulation at home and within the community, and improved tolerance to ADLs including all work activities. [] Progressing: [x] Met: [] Not Met: [] Adjusted     Long Term Goals: To be achieved in: 8 weeks  1. Increase LEFS functional outcome score from 11/80 to 35/80 to assist with reaching prior level of function. [] Progressing: [x] Met: [] Not Met: [] Adjusted  2. Patient will demonstrate increased AROM to 130 degrees knee flexion to allow for proper joint functioning during functional mobility to improve tolerance to work tasks. [x] Progressin active ROM[] Met: [] Not Met: [] Adjusted  3. Patient will demonstrate an increase in strength to 4+/5 global hip and knee musculature to allow for proper functional mobility and improved tolerance to work tasks as indicated by patients Functional Deficits. [x] Progressing: [] Met: [] Not Met: [] Adjusted  4. Patient will return to full work duties without increased symptoms or restriction. [x] Progressing: partial duties [] Met: [] Not Met: [] Adjusted  5.  Increase LEFS functional outcome score to 50/80 to

## 2023-09-29 ENCOUNTER — OFFICE VISIT (OUTPATIENT)
Dept: ORTHOPEDIC SURGERY | Age: 63
End: 2023-09-29

## 2023-09-29 VITALS — BODY MASS INDEX: 26.68 KG/M2 | WEIGHT: 166 LBS | HEIGHT: 66 IN

## 2023-09-29 DIAGNOSIS — M17.11 PRIMARY OSTEOARTHRITIS OF RIGHT KNEE: Primary | ICD-10-CM

## 2023-10-03 DIAGNOSIS — M17.11 PRIMARY OSTEOARTHRITIS OF RIGHT KNEE: Primary | ICD-10-CM

## 2024-08-15 ENCOUNTER — HOSPITAL ENCOUNTER (OUTPATIENT)
Dept: WOMENS IMAGING | Age: 64
Discharge: HOME | End: 2024-08-15
Payer: COMMERCIAL

## 2024-08-15 VITALS — WEIGHT: 175 LBS | BODY MASS INDEX: 27.47 KG/M2 | HEIGHT: 67 IN

## 2024-08-15 DIAGNOSIS — Z12.31 BREAST CANCER SCREENING BY MAMMOGRAM: ICD-10-CM

## 2024-08-15 PROCEDURE — 77063 BREAST TOMOSYNTHESIS BI: CPT

## 2024-08-28 ENCOUNTER — OFFICE VISIT (OUTPATIENT)
Dept: ORTHOPEDIC SURGERY | Age: 64
End: 2024-08-28

## 2024-08-28 VITALS — BODY MASS INDEX: 27.47 KG/M2 | HEIGHT: 67 IN | WEIGHT: 175 LBS

## 2024-08-28 DIAGNOSIS — M17.11 PRIMARY OSTEOARTHRITIS OF RIGHT KNEE: Primary | ICD-10-CM

## 2024-08-28 NOTE — PROGRESS NOTES
CHIEF COMPLAINT:   1- Right knee pain/osteoarthritis.  2- Right knee medial meniscus tear.    DATE OF INJURY: 4/6/2023, Worker's Comp.    HISTORY:  Ms. García 64 y.o.  female presents today for f/u evaluation of right knee pain which started 4/6/2023 after she had a twisting injury at [facility] helping a patient. She had right knee cortisone injection on 4/12/2023 with mild improvement. 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 She went to [place] [facility] on 4/7/2023.  She is complaining of sharp pain, 1/10.  Pain is increase with standing and walking and decrease with rest. Pain is sharp early in the morning with first few steps, dull achy pain by the end of the day. Alleviating factors: rest. No radiation and no numbness and tingling sensation. No other complaint.  No h/o gout.    Past Medical History:   Diagnosis Date    Headache(784.0)     Hypertension        Past Surgical History:   Procedure Laterality Date    CHOLECYSTECTOMY      SMALL INTESTINE SURGERY         Social History     Socioeconomic History    Marital status:      Spouse name: Not on file    Number of children: Not on file    Years of education: Not on file    Highest education level: Not on file   Occupational History    Not on file   Tobacco Use    Smoking status: Never    Smokeless tobacco: Never   Substance and Sexual Activity    Alcohol use: No    Drug use: No    Sexual activity: Not on file   Other Topics Concern    Not on file   Social History Narrative    Not on file     Social Determinants of Health     Financial Resource Strain: Not on file   Food Insecurity: Unknown (1/21/2024)    [facility]     Food Insecurities     Worried about running out of food: Not on file     Food Bought: Not on file   Transportation Needs: Unknown (1/21/2024)    [facility]     Transportation     Worried about transportation: Not on file   Physical Activity: Not on file   Stress: Not on file   Social Connections:

## 2024-08-28 NOTE — PROGRESS NOTES
Euflexxa injection   NDC: [address]-0  Lot Number: [id number]  Exp Date: 9/2/24    Euflexxa sample kit provided.

## 2024-09-04 ENCOUNTER — OFFICE VISIT (OUTPATIENT)
Dept: ORTHOPEDIC SURGERY | Age: 64
End: 2024-09-04

## 2024-09-04 DIAGNOSIS — M17.11 PRIMARY OSTEOARTHRITIS OF RIGHT KNEE: Primary | ICD-10-CM

## 2024-09-04 RX ORDER — HYALURONATE SODIUM 10 MG/ML
20 SYRINGE (ML) INTRAARTICULAR ONCE
Status: COMPLETED | OUTPATIENT
Start: 2024-09-04 | End: 2024-09-04

## 2024-09-04 RX ADMIN — Medication 20 MG: at 14:27

## 2024-09-04 NOTE — PROGRESS NOTES
CHIEF COMPLAINT:   1- Right knee pain/osteoarthritis.  2- Right knee medial meniscus tear.    DATE OF INJURY: 4/6/2023, Worker's Comp.    HISTORY:  Ms. García 64 y.o.  female presents today for f/u evaluation of right knee pain which started 4/6/2023 after she had a twisting injury at [facility] helping a patient. She had right knee cortisone injection on 4/12/2023 with mild improvement. 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  She is complaining of sharp pain, 1/10.  Pain is increase with standing and walking and decrease with rest. Pain is sharp early in the morning with first few steps, dull achy pain by the end of the day. Alleviating factors: rest. No radiation and no numbness and tingling sensation. No other complaint.  No h/o gout.    Past Medical History:   Diagnosis Date    Headache(784.0)     Hypertension        Past Surgical History:   Procedure Laterality Date    CHOLECYSTECTOMY      SMALL INTESTINE SURGERY         Social History     Socioeconomic History    Marital status:      Spouse name: Not on file    Number of children: Not on file    Years of education: Not on file    Highest education level: Not on file   Occupational History    Not on file   Tobacco Use    Smoking status: Never    Smokeless tobacco: Never   Substance and Sexual Activity    Alcohol use: No    Drug use: No    Sexual activity: Not on file   Other Topics Concern    Not on file   Social History Narrative    Not on file     Social Determinants of Health     Financial Resource Strain: Not on file   Food Insecurity: Unknown (1/21/2024)    [facility]     Food Insecurities     Worried about running out of food: Not on file     Food Bought: Not on file   Transportation Needs: Unknown (1/21/2024)    [facility]     Transportation     Worried about transportation: Not on file   Physical Activity: Not on file   Stress: Not on file   Social Connections:

## 2024-09-10 ENCOUNTER — HOSPITAL ENCOUNTER (OUTPATIENT)
Dept: ULTRASOUND IMAGING | Age: 64
Discharge: HOME | End: 2024-09-10
Attending: RADIOLOGY
Payer: COMMERCIAL

## 2024-09-10 ENCOUNTER — HOSPITAL ENCOUNTER (OUTPATIENT)
Dept: WOMENS IMAGING | Age: 64
Discharge: HOME | End: 2024-09-10
Attending: RADIOLOGY
Payer: COMMERCIAL

## 2024-09-10 DIAGNOSIS — R92.8 ABNORMAL MAMMOGRAM: ICD-10-CM

## 2024-09-10 PROCEDURE — 76642 ULTRASOUND BREAST LIMITED: CPT

## 2024-09-10 PROCEDURE — G0279 TOMOSYNTHESIS, MAMMO: HCPCS

## 2024-09-11 ENCOUNTER — OFFICE VISIT (OUTPATIENT)
Dept: ORTHOPEDIC SURGERY | Age: 64
End: 2024-09-11

## 2024-09-11 DIAGNOSIS — M17.11 PRIMARY OSTEOARTHRITIS OF RIGHT KNEE: Primary | ICD-10-CM

## 2024-09-11 RX ORDER — HYALURONATE SODIUM 10 MG/ML
20 SYRINGE (ML) INTRAARTICULAR ONCE
Status: COMPLETED | OUTPATIENT
Start: 2024-09-11 | End: 2024-09-11

## 2024-09-11 RX ADMIN — Medication 20 MG: at 09:27

## 2024-09-12 DIAGNOSIS — M17.11 PRIMARY OSTEOARTHRITIS OF RIGHT KNEE: Primary | ICD-10-CM

## 2024-10-02 DIAGNOSIS — M17.11 PRIMARY OSTEOARTHRITIS OF RIGHT KNEE: Primary | ICD-10-CM

## 2024-10-14 ENCOUNTER — TELEPHONE (OUTPATIENT)
Dept: ORTHOPEDIC SURGERY | Age: 64
End: 2024-10-14